# Patient Record
Sex: MALE | Employment: OTHER | URBAN - METROPOLITAN AREA
[De-identification: names, ages, dates, MRNs, and addresses within clinical notes are randomized per-mention and may not be internally consistent; named-entity substitution may affect disease eponyms.]

---

## 2019-03-16 ENCOUNTER — HOSPITAL ENCOUNTER (EMERGENCY)
Facility: CLINIC | Age: 29
Discharge: HOME | End: 2019-03-16
Admitting: PHYSICIAN ASSISTANT

## 2019-03-16 VITALS
SYSTOLIC BLOOD PRESSURE: 155 MMHG | DIASTOLIC BLOOD PRESSURE: 77 MMHG | OXYGEN SATURATION: 100 % | TEMPERATURE: 98.2 F | HEIGHT: 70 IN | RESPIRATION RATE: 20 BRPM | WEIGHT: 174.16 LBS | BODY MASS INDEX: 24.93 KG/M2 | HEART RATE: 84 BPM

## 2019-03-16 DIAGNOSIS — R00.2 PALPITATIONS: ICD-10-CM

## 2019-03-16 DIAGNOSIS — T50.905A ADVERSE EFFECT OF DRUG, INITIAL ENCOUNTER: ICD-10-CM

## 2019-03-16 PROCEDURE — 99282 EMERGENCY DEPT VISIT SF MDM: CPT

## 2019-03-16 SDOH — HEALTH STABILITY: MENTAL HEALTH: HOW OFTEN DO YOU HAVE A DRINK CONTAINING ALCOHOL?: NEVER

## 2019-03-16 ASSESSMENT — ENCOUNTER SYMPTOMS
PALPITATIONS: 1
SHORTNESS OF BREATH: 0
TREMORS: 1

## 2019-03-16 ASSESSMENT — MIFFLIN-ST. JEOR: SCORE: 1757.5

## 2019-03-16 NOTE — ED AVS SNAPSHOT
Emergency Department  [address] [facility] [address]  Phone:  [phone]  Fax:  [phone]                                    Tristen Hernandez   MRN: [id number]    Department:   Emergency Department   Date of Visit:  3/16/2019           After Visit Summary Signature Page    I have received my discharge instructions, and my questions have been answered. I have discussed any challenges I see with this plan with the nurse or doctor.    ..........................................................................................................................................  Patient/Patient Representative Signature      ..........................................................................................................................................  Patient Representative Print Name and Relationship to Patient    ..................................................               ................................................  Date                                   Time    ..........................................................................................................................................  Reviewed by Signature/Title    ...................................................              ..............................................  Date                                               Time          22EPIC Rev 08/18

## 2019-03-16 NOTE — ED PROVIDER NOTES
"  History     Chief Complaint:  Palpitations      HPI   The patient's friend was used to translate to Macedonian and obtain the below history, as well as to explain diagnosis, plan of treatment, reasons to return to the emergency department and appropriate follow up.     Tristen Hernandez is an otherwise healthy 28 year old male with a history of tobacco smoking who presents to the ED for evaluation of palpitations. The patient reports that he has been taking a caffeine-based weight loss supplement, \"Lipo 6 Black,\" for the past three days. His friend states that he took one of these pills today and was warming up to play soccer one hour ago, when he began to experience the onset of palpitations and tremors. The patient reportedly felt as though his \"heart was beating fast\" and had concerns for a possible medication reaction, prompting him to visit the ED. Here in the ED, the patient endorses persistent palpitations but denies any chest pain or shortness of breath. Of note, the patient denies any history of cardiac problems or any other medical problems.    Allergies:  No known drug allergies.     Medications:    The patient is currently on no regular medications.     Past Medical History:    The patient denies any significant past medical history including cardiac history.    Past Surgical History:    The patient does not have any pertinent past surgical history.     Family History:    History reviewed. No pertinent family history.     Social History:  Marital Status:  Single   Smoking Status: Current every day smoker   Alcohol Use: No    Negative for drug use.      Review of Systems   Respiratory: Negative for shortness of breath.    Cardiovascular: Positive for palpitations. Negative for chest pain.   Neurological: Positive for tremors.   All other systems reviewed and are negative.    Physical Exam     Patient Vitals for the past 24 hrs:   BP Temp Temp src Pulse Resp SpO2 Height Weight   03/16/19 1801 155/77 " "98.2  F (36.8  C) Temporal 84 20 100 % 1.78 m (5' 10.08\") 79 kg (174 lb 2.6 oz)        Physical Exam  General: Alert and cooperative with exam. Mildly anxious appearing on gurney.  Head:  Scalp is NC/AT  Eyes:  No scleral icterus, PERRL  ENT:  The external nose and ears are normal.   Neck:  Normal range of motion without rigidity.  CV:  Regular rate and rhythm    No pathologic murmur, rubs, or gallops.  Resp:  Breath sounds are clear bilaterally.  No crackles, wheezes, rhonchi.    Non-labored, no retractions or accessory muscle use  GI:  Abdomen is soft, no distension, no tenderness, no masses. No peritoneal signs.  Bowel sounds present in all quadrants  :  No suprapubic or flank tenderness  MS:  No lower extremity edema or asymmetric calf swelling.  Skin:  Warm and dry, No rash or lesions noted.  Neuro: Oriented. No gross motor deficits.  Psych: Awake. Alert. Normal affect. Appropriate interactions.     Emergency Department Course   ECG:  Indication: Palpitations  Time: 1757  Vent. Rate 85 bpm. ND interval 152. QRS duration 92. QT/QTc 378/449. P-R-T axis 61 75 33.  Normal sinus rhythm. Minimal voltage criteria for LVH, may be normal variant. Borderline ECG. Read and signed by Dr. Santos at 1800.    Emergency Department Course:  EKG obtained in the ED, see results above.     Nursing notes and vitals reviewed. (1837) I performed an exam of the patient as documented above.     (184) I rechecked the patient and discussed the results of his workup thus far.     Findings and plan explained to the Patient. Patient discharged home with instructions regarding supportive care, medications, and reasons to return. The importance of close follow-up was reviewed.     I personally reviewed all results with the Patient and answered all related questions prior to discharge.        Impression & Plan      Medical Decision Makin-year-old male who presents to the emergency department with anxiety and palpitations following " ingestion of a over-the-counter weight loss supplement.  Patient history and records reviewed.  On examination, the patient is mildly hypertensive but is not tachycardic and is otherwise vitally stable.  EKG was obtained which shows no evidence of arrhythmia or ischemia.  His symptoms are consistent with ingestion of stimulant supplement which from review of ingredients contains caffeine and several other herbal formulations.  He has no chest pain or shortness of breath at this time.  I advised the patient not to take any more of the supplement and to return to the emergency department if any new or worsening symptoms develop.  The patient was discharged home with instruction to follow-up with primary care provider as needed.    Diagnosis:    ICD-10-CM    1. Adverse effect of drug, initial encounter T50.905A    2. Palpitations R00.2        Disposition:  discharged to home    Scribe Disclosure:  I, Terrie Kay, am serving as a scribe on 3/16/2019 at 6:37 PM to personally document services performed by VÍCTOR Mariano found based on my observations and the provider's statements to me.      Terrie Kay  3/16/2019    EMERGENCY DEPARTMENT       Kristopher Campbell PA-C  03/16/19 5010

## 2021-03-28 ENCOUNTER — APPOINTMENT (OUTPATIENT)
Dept: GENERAL RADIOLOGY | Facility: CLINIC | Age: 31
DRG: 494 | End: 2021-03-28
Attending: PHYSICIAN ASSISTANT

## 2021-03-28 ENCOUNTER — HOSPITAL ENCOUNTER (INPATIENT)
Facility: CLINIC | Age: 31
LOS: 2 days | Discharge: HOME | DRG: 494 | End: 2021-03-30
Attending: PHYSICIAN ASSISTANT | Admitting: INTERNAL MEDICINE

## 2021-03-28 DIAGNOSIS — T14.8XXA SKIN ABRASION: ICD-10-CM

## 2021-03-28 DIAGNOSIS — S82.201A TIBIA/FIBULA FRACTURE, RIGHT, CLOSED, INITIAL ENCOUNTER: ICD-10-CM

## 2021-03-28 DIAGNOSIS — S82.401A TIBIA/FIBULA FRACTURE, RIGHT, CLOSED, INITIAL ENCOUNTER: ICD-10-CM

## 2021-03-28 LAB
LABORATORY COMMENT REPORT: NORMAL
SARS-COV-2 RNA RESP QL NAA+PROBE: NEGATIVE
SPECIMEN SOURCE: NORMAL

## 2021-03-28 PROCEDURE — 250N000011 HC RX IP 250 OP 636: Performed by: INTERNAL MEDICINE

## 2021-03-28 PROCEDURE — 96365 THER/PROPH/DIAG IV INF INIT: CPT

## 2021-03-28 PROCEDURE — 250N000013 HC RX MED GY IP 250 OP 250 PS 637: Performed by: PHYSICIAN ASSISTANT

## 2021-03-28 PROCEDURE — 90715 TDAP VACCINE 7 YRS/> IM: CPT | Performed by: PHYSICIAN ASSISTANT

## 2021-03-28 PROCEDURE — 99285 EMERGENCY DEPT VISIT HI MDM: CPT | Mod: 25

## 2021-03-28 PROCEDURE — C9803 HOPD COVID-19 SPEC COLLECT: HCPCS

## 2021-03-28 PROCEDURE — 96375 TX/PRO/DX INJ NEW DRUG ADDON: CPT

## 2021-03-28 PROCEDURE — 87635 SARS-COV-2 COVID-19 AMP PRB: CPT | Performed by: PHYSICIAN ASSISTANT

## 2021-03-28 PROCEDURE — 120N000001 HC R&B MED SURG/OB

## 2021-03-28 PROCEDURE — 250N000013 HC RX MED GY IP 250 OP 250 PS 637: Performed by: INTERNAL MEDICINE

## 2021-03-28 PROCEDURE — 250N000011 HC RX IP 250 OP 636: Performed by: PHYSICIAN ASSISTANT

## 2021-03-28 PROCEDURE — 29505 APPLICATION LONG LEG SPLINT: CPT | Mod: RT

## 2021-03-28 PROCEDURE — 250N000011 HC RX IP 250 OP 636: Performed by: EMERGENCY MEDICINE

## 2021-03-28 PROCEDURE — 90471 IMMUNIZATION ADMIN: CPT | Performed by: PHYSICIAN ASSISTANT

## 2021-03-28 PROCEDURE — 96376 TX/PRO/DX INJ SAME DRUG ADON: CPT

## 2021-03-28 PROCEDURE — 99223 1ST HOSP IP/OBS HIGH 75: CPT | Mod: AI | Performed by: INTERNAL MEDICINE

## 2021-03-28 PROCEDURE — 73590 X-RAY EXAM OF LOWER LEG: CPT | Mod: RT

## 2021-03-28 PROCEDURE — 73610 X-RAY EXAM OF ANKLE: CPT | Mod: RT

## 2021-03-28 RX ORDER — AMOXICILLIN 250 MG
2 CAPSULE ORAL 2 TIMES DAILY PRN
Status: DISCONTINUED | OUTPATIENT
Start: 2021-03-28 | End: 2021-03-30 | Stop reason: HOSPADM

## 2021-03-28 RX ORDER — PROCHLORPERAZINE MALEATE 5 MG
10 TABLET ORAL EVERY 6 HOURS PRN
Status: DISCONTINUED | OUTPATIENT
Start: 2021-03-28 | End: 2021-03-29

## 2021-03-28 RX ORDER — NALOXONE HYDROCHLORIDE 0.4 MG/ML
0.4 INJECTION, SOLUTION INTRAMUSCULAR; INTRAVENOUS; SUBCUTANEOUS
Status: DISCONTINUED | OUTPATIENT
Start: 2021-03-28 | End: 2021-03-30 | Stop reason: HOSPADM

## 2021-03-28 RX ORDER — HYDROMORPHONE HYDROCHLORIDE 1 MG/ML
.3-.5 INJECTION, SOLUTION INTRAMUSCULAR; INTRAVENOUS; SUBCUTANEOUS
Status: DISCONTINUED | OUTPATIENT
Start: 2021-03-28 | End: 2021-03-29

## 2021-03-28 RX ORDER — PROCHLORPERAZINE 25 MG
25 SUPPOSITORY, RECTAL RECTAL EVERY 12 HOURS PRN
Status: DISCONTINUED | OUTPATIENT
Start: 2021-03-28 | End: 2021-03-29

## 2021-03-28 RX ORDER — ACETAMINOPHEN 325 MG/1
650 TABLET ORAL EVERY 4 HOURS PRN
Status: DISCONTINUED | OUTPATIENT
Start: 2021-03-28 | End: 2021-03-29

## 2021-03-28 RX ORDER — ONDANSETRON 2 MG/ML
4 INJECTION INTRAMUSCULAR; INTRAVENOUS ONCE
Status: COMPLETED | OUTPATIENT
Start: 2021-03-28 | End: 2021-03-28

## 2021-03-28 RX ORDER — NALOXONE HYDROCHLORIDE 0.4 MG/ML
0.2 INJECTION, SOLUTION INTRAMUSCULAR; INTRAVENOUS; SUBCUTANEOUS
Status: DISCONTINUED | OUTPATIENT
Start: 2021-03-28 | End: 2021-03-30 | Stop reason: HOSPADM

## 2021-03-28 RX ORDER — MORPHINE SULFATE 4 MG/ML
4 INJECTION, SOLUTION INTRAMUSCULAR; INTRAVENOUS ONCE
Status: COMPLETED | OUTPATIENT
Start: 2021-03-28 | End: 2021-03-28

## 2021-03-28 RX ORDER — IBUPROFEN 600 MG/1
600 TABLET, FILM COATED ORAL EVERY 6 HOURS PRN
Status: DISCONTINUED | OUTPATIENT
Start: 2021-03-28 | End: 2021-03-30 | Stop reason: HOSPADM

## 2021-03-28 RX ORDER — CEFAZOLIN SODIUM 2 G/100ML
2 INJECTION, SOLUTION INTRAVENOUS ONCE
Status: COMPLETED | OUTPATIENT
Start: 2021-03-28 | End: 2021-03-28

## 2021-03-28 RX ORDER — HYDROMORPHONE HYDROCHLORIDE 1 MG/ML
0.5 INJECTION, SOLUTION INTRAMUSCULAR; INTRAVENOUS; SUBCUTANEOUS ONCE
Status: COMPLETED | OUTPATIENT
Start: 2021-03-28 | End: 2021-03-28

## 2021-03-28 RX ORDER — POLYETHYLENE GLYCOL 3350 17 G/17G
17 POWDER, FOR SOLUTION ORAL DAILY PRN
Status: DISCONTINUED | OUTPATIENT
Start: 2021-03-28 | End: 2021-03-30 | Stop reason: HOSPADM

## 2021-03-28 RX ORDER — ONDANSETRON 2 MG/ML
4 INJECTION INTRAMUSCULAR; INTRAVENOUS EVERY 6 HOURS PRN
Status: DISCONTINUED | OUTPATIENT
Start: 2021-03-28 | End: 2021-03-30 | Stop reason: HOSPADM

## 2021-03-28 RX ORDER — AMOXICILLIN 250 MG
1 CAPSULE ORAL 2 TIMES DAILY PRN
Status: DISCONTINUED | OUTPATIENT
Start: 2021-03-28 | End: 2021-03-30 | Stop reason: HOSPADM

## 2021-03-28 RX ORDER — OXYCODONE HYDROCHLORIDE 5 MG/1
5-10 TABLET ORAL
Status: DISCONTINUED | OUTPATIENT
Start: 2021-03-28 | End: 2021-03-29

## 2021-03-28 RX ORDER — ONDANSETRON 4 MG/1
4 TABLET, ORALLY DISINTEGRATING ORAL EVERY 6 HOURS PRN
Status: DISCONTINUED | OUTPATIENT
Start: 2021-03-28 | End: 2021-03-30 | Stop reason: HOSPADM

## 2021-03-28 RX ORDER — CEFAZOLIN SODIUM 2 G/100ML
2 INJECTION, SOLUTION INTRAVENOUS EVERY 8 HOURS
Status: DISCONTINUED | OUTPATIENT
Start: 2021-03-28 | End: 2021-03-29

## 2021-03-28 RX ORDER — CYCLOBENZAPRINE HCL 10 MG
10 TABLET ORAL EVERY 8 HOURS PRN
Status: DISCONTINUED | OUTPATIENT
Start: 2021-03-28 | End: 2021-03-30 | Stop reason: HOSPADM

## 2021-03-28 RX ORDER — LIDOCAINE 40 MG/G
CREAM TOPICAL
Status: DISCONTINUED | OUTPATIENT
Start: 2021-03-28 | End: 2021-03-30 | Stop reason: HOSPADM

## 2021-03-28 RX ORDER — LORAZEPAM 2 MG/ML
0.5 INJECTION INTRAMUSCULAR ONCE
Status: COMPLETED | OUTPATIENT
Start: 2021-03-28 | End: 2021-03-28

## 2021-03-28 RX ORDER — OXYCODONE AND ACETAMINOPHEN 5; 325 MG/1; MG/1
1 TABLET ORAL ONCE
Status: COMPLETED | OUTPATIENT
Start: 2021-03-28 | End: 2021-03-28

## 2021-03-28 RX ADMIN — CLOSTRIDIUM TETANI TOXOID ANTIGEN (FORMALDEHYDE INACTIVATED), CORYNEBACTERIUM DIPHTHERIAE TOXOID ANTIGEN (FORMALDEHYDE INACTIVATED), BORDETELLA PERTUSSIS TOXOID ANTIGEN (GLUTARALDEHYDE INACTIVATED), BORDETELLA PERTUSSIS FILAMENTOUS HEMAGGLUTININ ANTIGEN (FORMALDEHYDE INACTIVATED), BORDETELLA PERTUSSIS PERTACTIN ANTIGEN, AND BORDETELLA PERTUSSIS FIMBRIAE 2/3 ANTIGEN 0.5 ML: 5; 2; 2.5; 5; 3; 5 INJECTION, SUSPENSION INTRAMUSCULAR at 12:46

## 2021-03-28 RX ADMIN — HYDROMORPHONE HYDROCHLORIDE 0.5 MG: 1 INJECTION, SOLUTION INTRAMUSCULAR; INTRAVENOUS; SUBCUTANEOUS at 14:05

## 2021-03-28 RX ADMIN — CEFAZOLIN SODIUM 2 G: 2 INJECTION, SOLUTION INTRAVENOUS at 21:33

## 2021-03-28 RX ADMIN — ACETAMINOPHEN 650 MG: 325 TABLET, FILM COATED ORAL at 21:33

## 2021-03-28 RX ADMIN — HYDROMORPHONE HYDROCHLORIDE 1 MG: 1 INJECTION, SOLUTION INTRAMUSCULAR; INTRAVENOUS; SUBCUTANEOUS at 11:52

## 2021-03-28 RX ADMIN — LORAZEPAM 0.5 MG: 2 INJECTION INTRAMUSCULAR; INTRAVENOUS at 14:16

## 2021-03-28 RX ADMIN — HYDROMORPHONE HYDROCHLORIDE 1 MG: 1 INJECTION, SOLUTION INTRAMUSCULAR; INTRAVENOUS; SUBCUTANEOUS at 12:43

## 2021-03-28 RX ADMIN — MORPHINE SULFATE 4 MG: 4 INJECTION INTRAVENOUS at 14:32

## 2021-03-28 RX ADMIN — CYCLOBENZAPRINE HYDROCHLORIDE 10 MG: 10 TABLET, FILM COATED ORAL at 17:03

## 2021-03-28 RX ADMIN — ONDANSETRON 4 MG: 2 INJECTION INTRAMUSCULAR; INTRAVENOUS at 12:41

## 2021-03-28 RX ADMIN — OXYCODONE HYDROCHLORIDE AND ACETAMINOPHEN 1 TABLET: 5; 325 TABLET ORAL at 14:32

## 2021-03-28 RX ADMIN — IBUPROFEN 600 MG: 600 TABLET ORAL at 17:03

## 2021-03-28 RX ADMIN — CEFAZOLIN SODIUM 2 G: 2 INJECTION, SOLUTION INTRAVENOUS at 13:52

## 2021-03-28 ASSESSMENT — ENCOUNTER SYMPTOMS
NUMBNESS: 0
HEADACHES: 0
ARTHRALGIAS: 0
JOINT SWELLING: 0
WEAKNESS: 0
WOUND: 1

## 2021-03-28 ASSESSMENT — ACTIVITIES OF DAILY LIVING (ADL)
ADLS_ACUITY_SCORE: 14
ADLS_ACUITY_SCORE: 12

## 2021-03-28 NOTE — PROGRESS NOTES
Ortho Brief - full consult to follow    29 yo M with Right tib/fib fx, ?Type 1 open, while playing soccer    Rec  Elevate RLE foam ramp  NPO p MN  Covid test  Plan IMN tibia tomorrow  Ancef q8h until surgery    Luis Lynn MD

## 2021-03-28 NOTE — PHARMACY-ADMISSION MEDICATION HISTORY
Admission medication history interview status for this patient is complete. See [facility] admission navigator for allergy information, prior to admission medications and immunization status.     Medication history interview done, indicate source(s): Patient  Medication history resources (including written lists, pill bottles, clinic record):None  Pharmacy: -    Changes made to PTA medication list:  Added: -  Deleted: -  Changed: -    Actions taken by pharmacist (provider contacted, etc):None     Additional medication history information:None    Medication reconciliation/reorder completed by provider prior to medication history?  no   (Y/N)     For patients on insulin therapy:   Do you use sliding scale insulin based on blood sugars?   What is your pre-meal insulin coverage?    Do you typically eat three meals a day?   How many times do you check your blood glucose per day?   How many episodes of hypoglycemia do you typically have per month?   Do you have a Continuous Glucose Monitor (CGM)?      Prior to Admission medications    Not on File

## 2021-03-28 NOTE — H&P (VIEW-ONLY)
History     Chief Complaint:  Leg Pain    HPI  Tristen Hernandez is a 30 year old male who presents for evaluation of right leg pain. The patient was playing soccer this morning when he kicked out with his right leg and kicked the knee of another player. Per his friend who was also playing, there was a loud crack and the patient's leg is now swollen and appears deformed. There is an abrasion to the right shin over the deformity. He has full sensation and range of motion in his right foot and denies any pain in his right knee or hip. All other extremities are atraumatic ad he denies any head trauma with this incident.         Review of Systems   Musculoskeletal: Negative for arthralgias and joint swelling.   Skin: Positive for wound.   Neurological: Negative for weakness, numbness and headaches.   All other systems reviewed and are negative.    Allergies:  The patient has no known allergies.     Medications:    The patient is not currently taking any prescribed medications.    Past Medical History:    No previous injuries to this leg    Social History:  The patient arrives with a friend  Plays soccer in [employer]      Physical Exam     Patient Vitals for the past 24 hrs:   BP Temp Pulse Resp SpO2   03/28/21 1415 -- -- -- -- 100 %   03/28/21 1400 111/72 -- 64 -- 99 %   03/28/21 1345 108/64 -- 73 -- 100 %   03/28/21 1330 113/63 -- 72 -- 98 %   03/28/21 1315 110/62 -- 80 -- 98 %   03/28/21 1300 111/70 -- 66 -- 99 %   03/28/21 1250 106/60 -- -- -- 99 %   03/28/21 1245 106/60 -- 72 -- --   03/28/21 1210 -- -- -- -- 100 %   03/28/21 1200 -- -- -- -- 100 %   03/28/21 1155 -- -- -- -- 100 %   03/28/21 1150 -- -- -- -- 100 %   03/28/21 1146 -- 97.4  F (36.3  C) -- -- --   03/28/21 1145 122/72 -- 74 20 100 %     Physical Exam  General: Alert and interactive. Appears well.   Head: Atraumatic, without obvious lesion, abrasion, hematoma.   Eyes: The pupils are equal and round. No scleral icterus.   ENT: No obvious  abnormalities to the ears or nose. Mucous membranes moist.   Neck:Trachea is in the midline. No obvious swelling to the neck. Full range of motion.   CV: Regular rate. Extremities well perfused. Capillary refill brisk in toes. DP pulses normal.   Resp: Non-labored, no retractions or accessory muscle use.     GI: Abdomen is not distended.   MS: Moving all extremities well.   Bruising and swelling to the right mid-shin with an abrasion, as pictured below. There is diffuse tenderness to this reason. Compartments are soft.   Patient able to wiggle toes, dorsiflex and plantar flex against resistance. No tenderness to medial or lateral malleolus. No tenderness to the metatarsals nor phalanges. Knee is non-tender but ROM deferred due to obvious pain mid-shin.           Skin: Abrasion to the shin. No obvious bone involvement within wound.   Neuro: Alert and oriented x 3. Non-focal examination.    Psych: Awake. Alert.  Normal affect. Appropriate interactions.    Emergency Department Course     Imaging:  XR Ankle, 3 views right  IMPRESSION: There are known diaphyseal fractures in the tibia and   fibula. Ankle radiographs show no other abnormalities  Reading per radiology    XR Tibia & Fibula, 2 views right  IMPRESSION:   1. Comminuted fracture involving the mid shaft of the tibia with 1 cm   of displacement and moderate apex medial and mild apex anterior   angulation.   2. Nondisplaced oblique fracture in the proximal shaft of the tibia.   3. Comminuted fracture at the junction of the proximal and middle   thirds of the shaft of the fibula with 29 mm of displacement and mild   apex medial and anterior angulation. There is also up to 1 cm of   bayonetting  Reading per radiology    Laboratory:  Asymptomatic COVID-19 PCR: negaitve      Procedures    Left Leg Splint Placement     PLACEMENT: Posterior long leg and stirrup splint was applied to the right lower extremity and after placement I checked and adjusted the fit to ensure  proper positioning. The patient was more comfortable with the splint in place. Sensation and circulation are intact after splint placement.     Emergency Department Course:    Reviewed:  I reviewed nursing notes, vitals and past medical history    Assessments:  1155 I obtained history and examined the patient as noted above.   1401 I rechecked the patient and explained findings. Splint placed as above.     Consults:   1320: I spoke with Dr. Lynn of orthopedic surgery regarding this patient.    1340: I spoke with Eri Soto of hospitalist service regarding this patient.    1350: I spoke with Dr. Faust of hospitalist service regarding this patient.    Interventions:  1152 Dilaudid 1 mg IV injection  1241 Zofran 4mg IV injection   1243 Dilaudid 1 mg IV injection  1246 Tdap 0.5 mL IM  1352 Ancef 2 g IV  1405 Dilaudid 0.5mg IV injection  1416 Lorazepam, 1 mg, IV injection  1432 Percocet 1 tab  1432 Morphine 4 mg IV    Disposition:  The patient was admitted to the hospital under the care of Dr. Faust.     Impression & Plan      Medical Decision Making:  Tristen Hernandez is a 30 year old male who presents to the emergency department today for evaluation of leg pain after an accident while playing soccer today.  X-rays reveal a comminuted fractures of the midshaft of both the tibia and the fibula that will need surgical repair. Discussed with on-call orthopedic trauma surgery who suggested admission to the hospital service for orthopedic management (tomorrow). He has a small abrasion without any bone protruding through the wound. However, orthopedics would like him to be started on empiric antibiotics for possible open fracture which qualifies him for inpatient admission. Compartments soft. Discussed with Dr. Faust, who will admit to inpatient medical service for further evaluation and treatment.     Covid-19  Tristen Hernandez was evaluated during a global COVID-19 pandemic, which necessitated  consideration that the patient might be at risk for infection with the SARS-CoV-2 virus that causes COVID-19.   Applicable protocols for evaluation were followed during the patient's care.   COVID-19 was considered as part of the patient's evaluation. The plan for testing is:  a test was obtained during this visit.      Diagnosis:    ICD-10-CM    1. Tibia/fibula fracture, right, closed, initial encounter  S82.201A Asymptomatic SARS-CoV-2 COVID-19 Virus (Coronavirus) by PCR    S82.401A    2. Skin abrasion  T14.8XXA        Scribe Disclosure:  Christin SHOEMAKER, am serving as a scribe at 11:55 AM on 3/28/2021 to document services personally performed by Dana Banda APC based on my observations and the provider's statements to me.    [facility] EMERGENCY DEPT     Dana Banda PA-C  03/28/21 8656

## 2021-03-28 NOTE — ED PROVIDER NOTES
Emergency Department Attending Supervision Note  3/28/2021  2:08 PM      I evaluated this patient in conjunction with Dana Banda PA-C      Briefly, the patient presented with right leg pain. The patient was playing soccer and he kicked out with his right leg and hit another player.      On my exam,     Physical Exam  CTAB, RRR  HEENT WNL  RLE - obvious swelling and deformity mid tibia, 2+ pulses with normal sensation    Results: See APC note    ED course:  XR, IV, pain meds, splint placed with assistance of APC    My impression is RLE tib/fib fracture  1.Splint placed  2.Ortho consult  3.admit for pain control        Diagnosis    ICD-10-CM    1. Tibia/fibula fracture, right, closed, initial encounter  S82.201A Asymptomatic SARS-CoV-2 COVID-19 Virus (Coronavirus) by PCR    S82.401A    2. Skin abrasion  T14.8XXA          Jennifer Cuello MD    I, [facility], am serving as a scribe at 2:08 PM on 3/28/2021 to document services personally performed by Jennifer Cuello MD based on my observations and the provider's statements to me.          Jennifer Cuello MD  04/13/21 4814

## 2021-03-28 NOTE — ED NOTES
Bed: ED24  Expected date: 3/28/21  Expected time: 11:37 AM  Means of arrival: Ambulance  Comments:  BV 2 30 M

## 2021-03-28 NOTE — H&P
Admitted:     03/28/2021      CHIEF COMPLAINT:  Right leg pain and deformity.      HISTORY OF PRESENT ILLNESS:  This is a 30-year-old Peruvian speaking male without any past medical history and not on any medication, who presented to the emergency room today with severe right leg pain and deformity.  The patient was playing soccer this morning when he kicked the ball, and he happened kick the knee of another player and felt pain.  Also, the other players and heard a loud crack, and the patient's leg was swollen and deformed.  The patient was brought to the emergency room.  There was area of abrasion to the right shin over the deformity with some opening and oozing blood.  He has full sensation and range of motion at the knee, as well as at the ankle joint.  He stated his pain is controlled at rest.  The patient denied any other symptoms.  In the emergency room, he was evaluated.  X-ray of the right tibia and fibula showed a comminuted fracture involving the midshaft of the tibia with displacement.  Also, a nondisplaced oblique fracture of the proximal shaft of the tibia and proximal medial third of the left shaft of the fibula is also fractured and displaced.  Orthopedic surgeon was contacted in the emergency room.  The patient was given a dose of IV antibiotic and is being admitted to the hospital.      PAST MEDICAL HISTORY:  None.     PAST SURGICAL HISTORY:  None.      ALLERGIES:  NO KNOWN DRUG ALLERGIES.      REVIEW OF SYSTEMS:  Ten points reviewed; all are negative except those mentioned in history of present illness.      HOME MEDICATIONS:  None.      PHYSICAL EXAMINATION:   GENERAL:  The patient is awake, alert, pleasant, and not in distress at rest.   VITAL SIGNS:  Blood pressure 110/62, pulse rate 80, temperature 97.4, oxygen saturation 98%.   HEENT:  Pink, nonicteric.  Extraocular muscle movement intact.   NECK:  Supple, no JVD, no thyromegaly, no lymphadenopathy.   CHEST:  Good air entry bilaterally.    CARDIOVASCULAR:  S1 and S2 regular, no gallop or murmur.   ABDOMEN:  Soft, nondistended, nontender.   EXTREMITIES:  Significant finding on the right lower extremity.  There is bruising and swelling to the right mid shin with areas of abrasion and oozing blood.  There is diffuse tenderness to the area, but the compartments are soft.  Capillary refill in his toes are normal.  There is no tingling sensation.  No neurovascular compromise.   NEUROLOGIC:  Alert and oriented x 3.  No focal neurologic deficits.   PSYCHIATRIC:  Normal mood and affect.  Keeps eye contact.  Responds to question appropriately.      DIAGNOSTIC TESTS OF INTEREST:  X-ray of the tibia and fibula showed comminuted fracture involving the midshaft of the tibia with 1 cm of displacement and moderate upper extremity and mid-apex anterior angulation, nondisplaced oblique fracture of the proximal shaft of the tibia.  Comminuted fracture of the proximal mid-third of the shaft of the fibula was also noted.  Labs were not done.  X-ray of the ankle joint showed no abnormalities.        ASSESSMENT AND PLAN:  This is a 30-year-old gentleman who presented to the emergency room following a trauma to his right leg while playing soccer.  He kicked out with his right leg and kicked the knee another player.  There was a large crack, and he was found to have a fracture of the right lower extremity.  He is being admitted to the hospital.   1.  Right mid-tibia and fibular fracture.   2.  Right shin ulcerated wound.      PLAN:  The patient is being admitted as an inpatient for pain control.  Orthopedic Surgery was consulted and recommended IV antibiotic and for the patient to have surgery tomorrow.  There is an area of ulcerated wound.  It is not clear if it is communicating or directly in contact with the bone.  It seems to be superficial, site of impact but doubt if it is communicating with the area of the fracture.  There is no neurovascular compromise at this point  that needs to be monitored.  Orthopedic Surgery is involved.  He will be on Ancef 2 grams IV every 8 hours.  The patient will be n.p.o. after midnight.  Ortho stated to the ED provider the surgery will be done tomorrow.  I discussed with the ED provider, Dana Banda PA-C, and I also discussed with the patient.  The patient understands English.  He speaks Romanian.  His friend is at bedside who helped with interpretation per the patient's request.         DARNELL JOHNS MD             D: 2021   T: 2021   MT:       Name:     ANKUR PERRY   MRN:      [id number]        Account:      [id number]   :      1990        Admitted:     2021                   Document: [id number]

## 2021-03-28 NOTE — ED PROVIDER NOTES
History     Chief Complaint:  Leg Pain    HPI  Tristen Hernandez is a 30 year old male who presents for evaluation of right leg pain. The patient was playing soccer this morning when he kicked out with his right leg and kicked the knee of another player. Per his friend who was also playing, there was a loud crack and the patient's leg is now swollen and appears deformed. There is an abrasion to the right shin over the deformity. He has full sensation and range of motion in his right foot and denies any pain in his right knee or hip. All other extremities are atraumatic ad he denies any head trauma with this incident.         Review of Systems   Musculoskeletal: Negative for arthralgias and joint swelling.   Skin: Positive for wound.   Neurological: Negative for weakness, numbness and headaches.   All other systems reviewed and are negative.    Allergies:  The patient has no known allergies.     Medications:    The patient is not currently taking any prescribed medications.    Past Medical History:    No previous injuries to this leg    Social History:  The patient arrives with a friend  Plays soccer in [employer]      Physical Exam     Patient Vitals for the past 24 hrs:   BP Temp Pulse Resp SpO2   03/28/21 1415 -- -- -- -- 100 %   03/28/21 1400 111/72 -- 64 -- 99 %   03/28/21 1345 108/64 -- 73 -- 100 %   03/28/21 1330 113/63 -- 72 -- 98 %   03/28/21 1315 110/62 -- 80 -- 98 %   03/28/21 1300 111/70 -- 66 -- 99 %   03/28/21 1250 106/60 -- -- -- 99 %   03/28/21 1245 106/60 -- 72 -- --   03/28/21 1210 -- -- -- -- 100 %   03/28/21 1200 -- -- -- -- 100 %   03/28/21 1155 -- -- -- -- 100 %   03/28/21 1150 -- -- -- -- 100 %   03/28/21 1146 -- 97.4  F (36.3  C) -- -- --   03/28/21 1145 122/72 -- 74 20 100 %     Physical Exam  General: Alert and interactive. Appears well.   Head: Atraumatic, without obvious lesion, abrasion, hematoma.   Eyes: The pupils are equal and round. No scleral icterus.   ENT: No obvious  abnormalities to the ears or nose. Mucous membranes moist.   Neck:Trachea is in the midline. No obvious swelling to the neck. Full range of motion.   CV: Regular rate. Extremities well perfused. Capillary refill brisk in toes. DP pulses normal.   Resp: Non-labored, no retractions or accessory muscle use.     GI: Abdomen is not distended.   MS: Moving all extremities well.   Bruising and swelling to the right mid-shin with an abrasion, as pictured below. There is diffuse tenderness to this reason. Compartments are soft.   Patient able to wiggle toes, dorsiflex and plantar flex against resistance. No tenderness to medial or lateral malleolus. No tenderness to the metatarsals nor phalanges. Knee is non-tender but ROM deferred due to obvious pain mid-shin.           Skin: Abrasion to the shin. No obvious bone involvement within wound.   Neuro: Alert and oriented x 3. Non-focal examination.    Psych: Awake. Alert.  Normal affect. Appropriate interactions.    Emergency Department Course     Imaging:  XR Ankle, 3 views right  IMPRESSION: There are known diaphyseal fractures in the tibia and   fibula. Ankle radiographs show no other abnormalities  Reading per radiology    XR Tibia & Fibula, 2 views right  IMPRESSION:   1. Comminuted fracture involving the mid shaft of the tibia with 1 cm   of displacement and moderate apex medial and mild apex anterior   angulation.   2. Nondisplaced oblique fracture in the proximal shaft of the tibia.   3. Comminuted fracture at the junction of the proximal and middle   thirds of the shaft of the fibula with 29 mm of displacement and mild   apex medial and anterior angulation. There is also up to 1 cm of   bayonetting  Reading per radiology    Laboratory:  Asymptomatic COVID-19 PCR: negaitve      Procedures    Left Leg Splint Placement     PLACEMENT: Posterior long leg and stirrup splint was applied to the right lower extremity and after placement I checked and adjusted the fit to ensure  proper positioning. The patient was more comfortable with the splint in place. Sensation and circulation are intact after splint placement.     Emergency Department Course:    Reviewed:  I reviewed nursing notes, vitals and past medical history    Assessments:  1155 I obtained history and examined the patient as noted above.   1401 I rechecked the patient and explained findings. Splint placed as above.     Consults:   1320: I spoke with Dr. Lynn of orthopedic surgery regarding this patient.    1340: I spoke with Eri Soto of hospitalist service regarding this patient.    1350: I spoke with Dr. Faust of hospitalist service regarding this patient.    Interventions:  1152 Dilaudid 1 mg IV injection  1241 Zofran 4mg IV injection   1243 Dilaudid 1 mg IV injection  1246 Tdap 0.5 mL IM  1352 Ancef 2 g IV  1405 Dilaudid 0.5mg IV injection  1416 Lorazepam, 1 mg, IV injection  1432 Percocet 1 tab  1432 Morphine 4 mg IV    Disposition:  The patient was admitted to the hospital under the care of Dr. Faust.     Impression & Plan      Medical Decision Making:  Tristen Hernandez is a 30 year old male who presents to the emergency department today for evaluation of leg pain after an accident while playing soccer today.  X-rays reveal a comminuted fractures of the midshaft of both the tibia and the fibula that will need surgical repair. Discussed with on-call orthopedic trauma surgery who suggested admission to the hospital service for orthopedic management (tomorrow). He has a small abrasion without any bone protruding through the wound. However, orthopedics would like him to be started on empiric antibiotics for possible open fracture which qualifies him for inpatient admission. Compartments soft. Discussed with Dr. Faust, who will admit to inpatient medical service for further evaluation and treatment.     Covid-19  Tristen Hernandez was evaluated during a global COVID-19 pandemic, which necessitated  consideration that the patient might be at risk for infection with the SARS-CoV-2 virus that causes COVID-19.   Applicable protocols for evaluation were followed during the patient's care.   COVID-19 was considered as part of the patient's evaluation. The plan for testing is:  a test was obtained during this visit.      Diagnosis:    ICD-10-CM    1. Tibia/fibula fracture, right, closed, initial encounter  S82.201A Asymptomatic SARS-CoV-2 COVID-19 Virus (Coronavirus) by PCR    S82.401A    2. Skin abrasion  T14.8XXA        Scribe Disclosure:  Christin SHOEMAKER, am serving as a scribe at 11:55 AM on 3/28/2021 to document services personally performed by Dana Banda APC based on my observations and the provider's statements to me.    [facility] EMERGENCY DEPT     Dana Banda PA-C  03/28/21 [id number]

## 2021-03-28 NOTE — ED NOTES
[facility]  ED Nurse Handoff Report    Tristen Hernandez is a 30 year old male   ED Chief complaint: Leg Pain  . ED Diagnosis:   Final diagnoses:   Tibia/fibula fracture, right, closed, initial encounter     Allergies: No Known Allergies    Code Status: Full Code  Activity level - Baseline/Home:  Independent. Activity Level - Current:   Assist X 2. Lift room needed: No. Bariatric: No   Needed: Yes   Isolation: No. Infection: Not Applicable.     Vital Signs:   Vitals:    03/28/21 1330 03/28/21 1345 03/28/21 1400 03/28/21 1415   BP: 113/63 108/64 111/72    Pulse: 72 73 64    Resp:       Temp:       SpO2: 98% 100% 99% 100%       Cardiac Rhythm:  ,      Pain level:    Patient confused: No. Patient Falls Risk: No.   Elimination Status: Has not voided yet   Patient Report - Initial Complaint: Leg pain. Focused Assessment: Tristen Hernandez is a 30 year old male who presents for evaluation of right leg pain. The patient was playing soccer this morning when he kicked out with his right leg and kicked the knee of another player. Per his friend who was also playing, there was a loud crack and the patient's leg is now swollen and appears deformed. There is an abrasion to the right shin over the deformity. He has full sensation and range of motion in his right foot and denies any pain in his right knee or hip. All other extremities are atraumatic ad he denies any head trauma with this incident.        Leg was splinted in ER  Tests Performed: Xray. Abnormal Results:   Ankle XR, G/E 3 views, right   Final Result   IMPRESSION: There are known diaphyseal fractures in the tibia and   fibula. Ankle radiographs show no other abnormalities.      DADA MIX MD      XR Tibia & Fibula Right 2 Views   Final Result   IMPRESSION:   1. Comminuted fracture involving the mid shaft of the tibia with 1 cm   of displacement and moderate apex medial and mild apex anterior   angulation.   2.  Nondisplaced oblique fracture in the proximal shaft of the tibia.   3. Comminuted fracture at the junction of the proximal and middle   thirds of the shaft of the fibula with 29 mm of displacement and mild   apex medial and anterior angulation. There is also up to 1 cm of   bayonetting.      DADA MIX MD          Treatments provided:Pain Medication, Tetanus   Family Comments:  OBS brochure/video discussed/provided to patient:  TBD  ED Medications:   Medications   ceFAZolin (ANCEF) intermittent infusion 2 g in 100 mL dextrose PRE-MIX (2 g Intravenous New Bag 3/28/21 1352)   HYDROmorphone (DILAUDID) injection 1 mg (1 mg Intravenous Given 3/28/21 1152)   Tdap (tetanus-diphtheria-acell pertussis) (ADACEL) injection 0.5 mL (0.5 mLs Intramuscular Given 3/28/21 1246)   HYDROmorphone (DILAUDID) injection 1 mg (1 mg Intravenous Given 3/28/21 1243)   ondansetron (ZOFRAN) injection 4 mg (4 mg Intravenous Given 3/28/21 1241)   HYDROmorphone (PF) (DILAUDID) injection 0.5 mg (0.5 mg Intravenous Given 3/28/21 1405)   LORazepam (ATIVAN) injection 0.5 mg (0.5 mg Intravenous Given 3/28/21 1416)     Drips infusing:  No  For the majority of the shift, the patient's behavior Green. Interventions performed were NA.    Sepsis treatment initiated: No     Patient tested for COVID 19 prior to admission:    ED Nurse Name/Phone Number: Niurka Mercedes RN,   12:57 PM  RECEIVING UNIT ED HANDOFF REVIEW    Above ED Nurse Handoff Report was reviewed: Yes  Reviewed by: Nusrat Meeks RN on March 28, 2021 at 2:37 PM

## 2021-03-28 NOTE — PROGRESS NOTES
Arrived to the floor at 1450 via cart. assisted to bed. Elevated R leg on 2 pillows, ice packs to LLE. Vitals done. Pt hungry--ordered reg diet, wife present.

## 2021-03-29 ENCOUNTER — APPOINTMENT (OUTPATIENT)
Dept: GENERAL RADIOLOGY | Facility: CLINIC | Age: 31
DRG: 494 | End: 2021-03-29
Attending: INTERNAL MEDICINE

## 2021-03-29 ENCOUNTER — ANESTHESIA EVENT (OUTPATIENT)
Dept: SURGERY | Facility: CLINIC | Age: 31
DRG: 494 | End: 2021-03-29

## 2021-03-29 ENCOUNTER — ANESTHESIA (OUTPATIENT)
Dept: SURGERY | Facility: CLINIC | Age: 31
DRG: 494 | End: 2021-03-29

## 2021-03-29 LAB
ANION GAP SERPL CALCULATED.3IONS-SCNC: 6 MMOL/L (ref 3–14)
BUN SERPL-MCNC: 22 MG/DL (ref 7–30)
CALCIUM SERPL-MCNC: 8.3 MG/DL (ref 8.5–10.1)
CHLORIDE SERPL-SCNC: 106 MMOL/L (ref 94–109)
CO2 SERPL-SCNC: 26 MMOL/L (ref 20–32)
CREAT SERPL-MCNC: 1.01 MG/DL (ref 0.66–1.25)
ERYTHROCYTE [DISTWIDTH] IN BLOOD BY AUTOMATED COUNT: 13.2 % (ref 10–15)
GFR SERPL CREATININE-BSD FRML MDRD: >90 ML/MIN/{1.73_M2}
GLUCOSE SERPL-MCNC: 92 MG/DL (ref 70–99)
HCT VFR BLD AUTO: 45.6 % (ref 40–53)
HGB BLD-MCNC: 15 G/DL (ref 13.3–17.7)
INR PPP: 1.05 (ref 0.86–1.14)
MCH RBC QN AUTO: 31.6 PG (ref 26.5–33)
MCHC RBC AUTO-ENTMCNC: 32.9 G/DL (ref 31.5–36.5)
MCV RBC AUTO: 96 FL (ref 78–100)
PLATELET # BLD AUTO: 215 10E9/L (ref 150–450)
POTASSIUM SERPL-SCNC: 4.6 MMOL/L (ref 3.4–5.3)
RBC # BLD AUTO: 4.74 10E12/L (ref 4.4–5.9)
SODIUM SERPL-SCNC: 138 MMOL/L (ref 133–144)
WBC # BLD AUTO: 9.4 10E9/L (ref 4–11)

## 2021-03-29 PROCEDURE — 250N000009 HC RX 250: Performed by: ANESTHESIOLOGY

## 2021-03-29 PROCEDURE — 258N000003 HC RX IP 258 OP 636: Performed by: ANESTHESIOLOGY

## 2021-03-29 PROCEDURE — 250N000009 HC RX 250: Performed by: ORTHOPAEDIC SURGERY

## 2021-03-29 PROCEDURE — 120N000001 HC R&B MED SURG/OB

## 2021-03-29 PROCEDURE — C1713 ANCHOR/SCREW BN/BN,TIS/BN: HCPCS | Performed by: ORTHOPAEDIC SURGERY

## 2021-03-29 PROCEDURE — 250N000013 HC RX MED GY IP 250 OP 250 PS 637: Performed by: INTERNAL MEDICINE

## 2021-03-29 PROCEDURE — 250N000011 HC RX IP 250 OP 636: Performed by: PHYSICIAN ASSISTANT

## 2021-03-29 PROCEDURE — 250N000011 HC RX IP 250 OP 636: Performed by: NURSE ANESTHETIST, CERTIFIED REGISTERED

## 2021-03-29 PROCEDURE — 360N000084 HC SURGERY LEVEL 4 W/ FLUORO, PER MIN: Performed by: ORTHOPAEDIC SURGERY

## 2021-03-29 PROCEDURE — 85027 COMPLETE CBC AUTOMATED: CPT | Performed by: INTERNAL MEDICINE

## 2021-03-29 PROCEDURE — 999N000141 HC STATISTIC PRE-PROCEDURE NURSING ASSESSMENT: Performed by: ORTHOPAEDIC SURGERY

## 2021-03-29 PROCEDURE — 85610 PROTHROMBIN TIME: CPT | Performed by: INTERNAL MEDICINE

## 2021-03-29 PROCEDURE — 258N000001 HC RX 258: Performed by: ORTHOPAEDIC SURGERY

## 2021-03-29 PROCEDURE — 999N000179 XR SURGERY CARM FLUORO LESS THAN 5 MIN W STILLS: Mod: TC

## 2021-03-29 PROCEDURE — 0QSG06Z REPOSITION RIGHT TIBIA WITH INTRAMEDULLARY INTERNAL FIXATION DEVICE, OPEN APPROACH: ICD-10-PCS | Performed by: ORTHOPAEDIC SURGERY

## 2021-03-29 PROCEDURE — 250N000011 HC RX IP 250 OP 636: Performed by: INTERNAL MEDICINE

## 2021-03-29 PROCEDURE — 99207 PR NON-BILLABLE SERV PER CHARTING: CPT | Performed by: INTERNAL MEDICINE

## 2021-03-29 PROCEDURE — 710N000009 HC RECOVERY PHASE 1, LEVEL 1, PER MIN: Performed by: ORTHOPAEDIC SURGERY

## 2021-03-29 PROCEDURE — 250N000013 HC RX MED GY IP 250 OP 250 PS 637: Performed by: PHYSICIAN ASSISTANT

## 2021-03-29 PROCEDURE — 370N000017 HC ANESTHESIA TECHNICAL FEE, PER MIN: Performed by: ORTHOPAEDIC SURGERY

## 2021-03-29 PROCEDURE — 272N000001 HC OR GENERAL SUPPLY STERILE: Performed by: ORTHOPAEDIC SURGERY

## 2021-03-29 PROCEDURE — 80048 BASIC METABOLIC PNL TOTAL CA: CPT | Performed by: INTERNAL MEDICINE

## 2021-03-29 PROCEDURE — 0QSJXZZ REPOSITION RIGHT FIBULA, EXTERNAL APPROACH: ICD-10-PCS | Performed by: ORTHOPAEDIC SURGERY

## 2021-03-29 PROCEDURE — 250N000011 HC RX IP 250 OP 636: Performed by: ORTHOPAEDIC SURGERY

## 2021-03-29 PROCEDURE — 250N000009 HC RX 250: Performed by: NURSE ANESTHETIST, CERTIFIED REGISTERED

## 2021-03-29 PROCEDURE — 36415 COLL VENOUS BLD VENIPUNCTURE: CPT | Performed by: INTERNAL MEDICINE

## 2021-03-29 PROCEDURE — 250N000011 HC RX IP 250 OP 636: Performed by: ANESTHESIOLOGY

## 2021-03-29 DEVICE — IMPLANTABLE DEVICE: Type: IMPLANTABLE DEVICE | Site: LEG | Status: FUNCTIONAL

## 2021-03-29 DEVICE — 5.0MM TI LOCKING SCREW W/T25 STARDRIVE 36MM F/IM NAIL-STER: Type: IMPLANTABLE DEVICE | Site: LEG | Status: FUNCTIONAL

## 2021-03-29 DEVICE — 5.0MM TI LOCKING SCREW W/T25 STARDRIVE 32MM F/IM NAIL-STER: Type: IMPLANTABLE DEVICE | Site: LEG | Status: FUNCTIONAL

## 2021-03-29 DEVICE — 5.0MM TI LOCKING SCREW W/T25 STARDRIVE 40MM F/IM NAIL-STER: Type: IMPLANTABLE DEVICE | Site: LEG | Status: FUNCTIONAL

## 2021-03-29 DEVICE — 10MM TI CANNULATED TIBIAL NAIL-EX/345MM-STERILE
Type: IMPLANTABLE DEVICE | Site: LEG | Status: FUNCTIONAL
Brand: EXPERT NAIL

## 2021-03-29 RX ORDER — SODIUM CHLORIDE, SODIUM LACTATE, POTASSIUM CHLORIDE, CALCIUM CHLORIDE 600; 310; 30; 20 MG/100ML; MG/100ML; MG/100ML; MG/100ML
INJECTION, SOLUTION INTRAVENOUS CONTINUOUS
Status: DISCONTINUED | OUTPATIENT
Start: 2021-03-29 | End: 2021-03-29 | Stop reason: HOSPADM

## 2021-03-29 RX ORDER — CYCLOBENZAPRINE HCL 10 MG
10 TABLET ORAL EVERY 8 HOURS PRN
Qty: 30 TABLET | Refills: 0 | Status: SHIPPED | OUTPATIENT
Start: 2021-03-29

## 2021-03-29 RX ORDER — IBUPROFEN 600 MG/1
600 TABLET, FILM COATED ORAL EVERY 6 HOURS PRN
COMMUNITY
Start: 2021-03-29

## 2021-03-29 RX ORDER — ONDANSETRON 2 MG/ML
INJECTION INTRAMUSCULAR; INTRAVENOUS PRN
Status: DISCONTINUED | OUTPATIENT
Start: 2021-03-29 | End: 2021-03-29

## 2021-03-29 RX ORDER — CEFAZOLIN SODIUM 2 G/100ML
2 INJECTION, SOLUTION INTRAVENOUS
Status: DISCONTINUED | OUTPATIENT
Start: 2021-03-29 | End: 2021-03-29 | Stop reason: HOSPADM

## 2021-03-29 RX ORDER — CEFAZOLIN SODIUM 1 G/3ML
1 INJECTION, POWDER, FOR SOLUTION INTRAMUSCULAR; INTRAVENOUS EVERY 8 HOURS
Status: COMPLETED | OUTPATIENT
Start: 2021-03-29 | End: 2021-03-30

## 2021-03-29 RX ORDER — HYDROMORPHONE HYDROCHLORIDE 1 MG/ML
.3-.5 INJECTION, SOLUTION INTRAMUSCULAR; INTRAVENOUS; SUBCUTANEOUS EVERY 10 MIN PRN
Status: DISCONTINUED | OUTPATIENT
Start: 2021-03-29 | End: 2021-03-29 | Stop reason: HOSPADM

## 2021-03-29 RX ORDER — POLYETHYLENE GLYCOL 3350 17 G/17G
17 POWDER, FOR SOLUTION ORAL DAILY PRN
COMMUNITY
Start: 2021-03-29

## 2021-03-29 RX ORDER — FENTANYL CITRATE 50 UG/ML
25-50 INJECTION, SOLUTION INTRAMUSCULAR; INTRAVENOUS
Status: DISCONTINUED | OUTPATIENT
Start: 2021-03-29 | End: 2021-03-29 | Stop reason: HOSPADM

## 2021-03-29 RX ORDER — DOCUSATE SODIUM 100 MG/1
100 CAPSULE, LIQUID FILLED ORAL 2 TIMES DAILY
Status: DISCONTINUED | OUTPATIENT
Start: 2021-03-29 | End: 2021-03-30 | Stop reason: HOSPADM

## 2021-03-29 RX ORDER — PROPOFOL 10 MG/ML
INJECTION, EMULSION INTRAVENOUS CONTINUOUS PRN
Status: DISCONTINUED | OUTPATIENT
Start: 2021-03-29 | End: 2021-03-29

## 2021-03-29 RX ORDER — HYDROMORPHONE HCL IN WATER/PF 6 MG/30 ML
0.2 PATIENT CONTROLLED ANALGESIA SYRINGE INTRAVENOUS
Status: DISCONTINUED | OUTPATIENT
Start: 2021-03-29 | End: 2021-03-30 | Stop reason: HOSPADM

## 2021-03-29 RX ORDER — PROCHLORPERAZINE MALEATE 5 MG
10 TABLET ORAL EVERY 6 HOURS PRN
Status: DISCONTINUED | OUTPATIENT
Start: 2021-03-29 | End: 2021-03-30 | Stop reason: HOSPADM

## 2021-03-29 RX ORDER — DEXAMETHASONE SODIUM PHOSPHATE 4 MG/ML
INJECTION, SOLUTION INTRA-ARTICULAR; INTRALESIONAL; INTRAMUSCULAR; INTRAVENOUS; SOFT TISSUE PRN
Status: DISCONTINUED | OUTPATIENT
Start: 2021-03-29 | End: 2021-03-29

## 2021-03-29 RX ORDER — METHOCARBAMOL 750 MG/1
750 TABLET, FILM COATED ORAL EVERY 6 HOURS PRN
Status: DISCONTINUED | OUTPATIENT
Start: 2021-03-29 | End: 2021-03-30 | Stop reason: HOSPADM

## 2021-03-29 RX ORDER — SODIUM CHLORIDE, SODIUM LACTATE, POTASSIUM CHLORIDE, CALCIUM CHLORIDE 600; 310; 30; 20 MG/100ML; MG/100ML; MG/100ML; MG/100ML
INJECTION, SOLUTION INTRAVENOUS CONTINUOUS
Status: DISCONTINUED | OUTPATIENT
Start: 2021-03-29 | End: 2021-03-30 | Stop reason: HOSPADM

## 2021-03-29 RX ORDER — KETAMINE HYDROCHLORIDE 10 MG/ML
INJECTION INTRAMUSCULAR; INTRAVENOUS PRN
Status: DISCONTINUED | OUTPATIENT
Start: 2021-03-29 | End: 2021-03-29

## 2021-03-29 RX ORDER — LIDOCAINE 40 MG/G
CREAM TOPICAL
Status: DISCONTINUED | OUTPATIENT
Start: 2021-03-29 | End: 2021-03-29 | Stop reason: HOSPADM

## 2021-03-29 RX ORDER — ONDANSETRON 4 MG/1
4 TABLET, ORALLY DISINTEGRATING ORAL EVERY 6 HOURS PRN
Status: DISCONTINUED | OUTPATIENT
Start: 2021-03-29 | End: 2021-03-29

## 2021-03-29 RX ORDER — PROPOFOL 10 MG/ML
INJECTION, EMULSION INTRAVENOUS PRN
Status: DISCONTINUED | OUTPATIENT
Start: 2021-03-29 | End: 2021-03-29

## 2021-03-29 RX ORDER — AMOXICILLIN 250 MG
1 CAPSULE ORAL 2 TIMES DAILY PRN
Qty: 30 TABLET | Refills: 0 | Status: SHIPPED | OUTPATIENT
Start: 2021-03-29

## 2021-03-29 RX ORDER — ONDANSETRON 4 MG/1
4 TABLET, ORALLY DISINTEGRATING ORAL EVERY 30 MIN PRN
Status: DISCONTINUED | OUTPATIENT
Start: 2021-03-29 | End: 2021-03-29 | Stop reason: HOSPADM

## 2021-03-29 RX ORDER — ACETAMINOPHEN 325 MG/1
650 TABLET ORAL EVERY 6 HOURS PRN
COMMUNITY
Start: 2021-03-29

## 2021-03-29 RX ORDER — HYDROXYZINE HYDROCHLORIDE 25 MG/1
25 TABLET, FILM COATED ORAL EVERY 6 HOURS PRN
Status: DISCONTINUED | OUTPATIENT
Start: 2021-03-29 | End: 2021-03-30 | Stop reason: HOSPADM

## 2021-03-29 RX ORDER — ACETAMINOPHEN 325 MG/1
975 TABLET ORAL EVERY 8 HOURS
Status: DISCONTINUED | OUTPATIENT
Start: 2021-03-29 | End: 2021-03-30 | Stop reason: HOSPADM

## 2021-03-29 RX ORDER — CEFAZOLIN SODIUM 2 G/100ML
2 INJECTION, SOLUTION INTRAVENOUS SEE ADMIN INSTRUCTIONS
Status: DISCONTINUED | OUTPATIENT
Start: 2021-03-29 | End: 2021-03-29 | Stop reason: HOSPADM

## 2021-03-29 RX ORDER — OXYCODONE HYDROCHLORIDE 5 MG/1
10 TABLET ORAL EVERY 4 HOURS PRN
Status: DISCONTINUED | OUTPATIENT
Start: 2021-03-29 | End: 2021-03-30 | Stop reason: HOSPADM

## 2021-03-29 RX ORDER — OXYCODONE HYDROCHLORIDE 5 MG/1
5 TABLET ORAL EVERY 4 HOURS PRN
Status: DISCONTINUED | OUTPATIENT
Start: 2021-03-29 | End: 2021-03-30 | Stop reason: HOSPADM

## 2021-03-29 RX ORDER — ONDANSETRON 4 MG/1
4 TABLET, ORALLY DISINTEGRATING ORAL EVERY 6 HOURS PRN
Qty: 5 TABLET | Refills: 0 | Status: SHIPPED | OUTPATIENT
Start: 2021-03-29

## 2021-03-29 RX ORDER — POLYETHYLENE GLYCOL 3350 17 G/17G
17 POWDER, FOR SOLUTION ORAL DAILY
Status: DISCONTINUED | OUTPATIENT
Start: 2021-03-30 | End: 2021-03-30 | Stop reason: HOSPADM

## 2021-03-29 RX ORDER — AMOXICILLIN 250 MG
1 CAPSULE ORAL 2 TIMES DAILY
Status: DISCONTINUED | OUTPATIENT
Start: 2021-03-29 | End: 2021-03-30 | Stop reason: HOSPADM

## 2021-03-29 RX ORDER — PROCHLORPERAZINE 25 MG
25 SUPPOSITORY, RECTAL RECTAL EVERY 12 HOURS PRN
Status: DISCONTINUED | OUTPATIENT
Start: 2021-03-29 | End: 2021-03-30 | Stop reason: HOSPADM

## 2021-03-29 RX ORDER — HYDROMORPHONE HYDROCHLORIDE 1 MG/ML
0.4 INJECTION, SOLUTION INTRAMUSCULAR; INTRAVENOUS; SUBCUTANEOUS
Status: DISCONTINUED | OUTPATIENT
Start: 2021-03-29 | End: 2021-03-30 | Stop reason: HOSPADM

## 2021-03-29 RX ORDER — ONDANSETRON 2 MG/ML
4 INJECTION INTRAMUSCULAR; INTRAVENOUS EVERY 30 MIN PRN
Status: DISCONTINUED | OUTPATIENT
Start: 2021-03-29 | End: 2021-03-29 | Stop reason: HOSPADM

## 2021-03-29 RX ORDER — ACETAMINOPHEN 325 MG/1
650 TABLET ORAL EVERY 4 HOURS PRN
Status: DISCONTINUED | OUTPATIENT
Start: 2021-04-01 | End: 2021-03-30 | Stop reason: HOSPADM

## 2021-03-29 RX ORDER — ASPIRIN 325 MG
325 TABLET, DELAYED RELEASE (ENTERIC COATED) ORAL DAILY
Qty: 30 TABLET | Refills: 0 | Status: SHIPPED | OUTPATIENT
Start: 2021-03-29

## 2021-03-29 RX ORDER — LIDOCAINE 40 MG/G
CREAM TOPICAL
Status: DISCONTINUED | OUTPATIENT
Start: 2021-03-29 | End: 2021-03-30 | Stop reason: HOSPADM

## 2021-03-29 RX ORDER — OXYCODONE HYDROCHLORIDE 5 MG/1
5-10 TABLET ORAL
Qty: 40 TABLET | Refills: 0 | Status: SHIPPED | OUTPATIENT
Start: 2021-03-29

## 2021-03-29 RX ORDER — PROCHLORPERAZINE MALEATE 5 MG
10 TABLET ORAL EVERY 6 HOURS PRN
Status: DISCONTINUED | OUTPATIENT
Start: 2021-03-29 | End: 2021-03-29

## 2021-03-29 RX ORDER — BISACODYL 10 MG
10 SUPPOSITORY, RECTAL RECTAL DAILY PRN
Status: DISCONTINUED | OUTPATIENT
Start: 2021-03-29 | End: 2021-03-30 | Stop reason: HOSPADM

## 2021-03-29 RX ORDER — MEPERIDINE HYDROCHLORIDE 25 MG/ML
12.5 INJECTION INTRAMUSCULAR; INTRAVENOUS; SUBCUTANEOUS
Status: DISCONTINUED | OUTPATIENT
Start: 2021-03-29 | End: 2021-03-29 | Stop reason: HOSPADM

## 2021-03-29 RX ORDER — FENTANYL CITRATE 50 UG/ML
INJECTION, SOLUTION INTRAMUSCULAR; INTRAVENOUS PRN
Status: DISCONTINUED | OUTPATIENT
Start: 2021-03-29 | End: 2021-03-29

## 2021-03-29 RX ORDER — ONDANSETRON 2 MG/ML
4 INJECTION INTRAMUSCULAR; INTRAVENOUS EVERY 6 HOURS PRN
Status: DISCONTINUED | OUTPATIENT
Start: 2021-03-29 | End: 2021-03-29

## 2021-03-29 RX ORDER — BUPIVACAINE HYDROCHLORIDE AND EPINEPHRINE 5; 5 MG/ML; UG/ML
INJECTION, SOLUTION EPIDURAL; INTRACAUDAL; PERINEURAL PRN
Status: DISCONTINUED | OUTPATIENT
Start: 2021-03-29 | End: 2021-03-29 | Stop reason: HOSPADM

## 2021-03-29 RX ADMIN — SODIUM CHLORIDE, POTASSIUM CHLORIDE, SODIUM LACTATE AND CALCIUM CHLORIDE: 600; 310; 30; 20 INJECTION, SOLUTION INTRAVENOUS at 09:18

## 2021-03-29 RX ADMIN — MIDAZOLAM 2 MG: 1 INJECTION INTRAMUSCULAR; INTRAVENOUS at 09:18

## 2021-03-29 RX ADMIN — CEFAZOLIN SODIUM 2 G: 2 INJECTION, SOLUTION INTRAVENOUS at 09:29

## 2021-03-29 RX ADMIN — ASPIRIN 325 MG: 325 TABLET, COATED ORAL at 20:01

## 2021-03-29 RX ADMIN — FENTANYL CITRATE 50 MCG: 50 INJECTION, SOLUTION INTRAMUSCULAR; INTRAVENOUS at 09:21

## 2021-03-29 RX ADMIN — HYDROMORPHONE HYDROCHLORIDE 0.5 MG: 1 INJECTION, SOLUTION INTRAMUSCULAR; INTRAVENOUS; SUBCUTANEOUS at 12:49

## 2021-03-29 RX ADMIN — Medication 30 MG: at 09:34

## 2021-03-29 RX ADMIN — ONDANSETRON HYDROCHLORIDE 4 MG: 2 INJECTION, SOLUTION INTRAVENOUS at 09:29

## 2021-03-29 RX ADMIN — ACETAMINOPHEN 975 MG: 325 TABLET, FILM COATED ORAL at 22:34

## 2021-03-29 RX ADMIN — ACETAMINOPHEN 975 MG: 325 TABLET, FILM COATED ORAL at 15:20

## 2021-03-29 RX ADMIN — HYDROMORPHONE HYDROCHLORIDE 0.5 MG: 1 INJECTION, SOLUTION INTRAMUSCULAR; INTRAVENOUS; SUBCUTANEOUS at 09:40

## 2021-03-29 RX ADMIN — OXYCODONE HYDROCHLORIDE 5 MG: 5 TABLET ORAL at 15:20

## 2021-03-29 RX ADMIN — PROPOFOL 40 MCG/KG/MIN: 10 INJECTION, EMULSION INTRAVENOUS at 09:42

## 2021-03-29 RX ADMIN — FENTANYL CITRATE 50 MCG: 50 INJECTION, SOLUTION INTRAMUSCULAR; INTRAVENOUS at 12:34

## 2021-03-29 RX ADMIN — CEFAZOLIN 1 G: 1 INJECTION, POWDER, FOR SOLUTION INTRAMUSCULAR; INTRAVENOUS at 17:56

## 2021-03-29 RX ADMIN — DOCUSATE SODIUM 100 MG: 100 CAPSULE, LIQUID FILLED ORAL at 20:01

## 2021-03-29 RX ADMIN — CEFAZOLIN SODIUM 2 G: 2 INJECTION, SOLUTION INTRAVENOUS at 05:33

## 2021-03-29 RX ADMIN — FENTANYL CITRATE 50 MCG: 50 INJECTION, SOLUTION INTRAMUSCULAR; INTRAVENOUS at 09:25

## 2021-03-29 RX ADMIN — METHOCARBAMOL 750 MG: 750 TABLET ORAL at 18:29

## 2021-03-29 RX ADMIN — DOCUSATE SODIUM 50 MG AND SENNOSIDES 8.6 MG 1 TABLET: 8.6; 5 TABLET, FILM COATED ORAL at 20:02

## 2021-03-29 RX ADMIN — IBUPROFEN 600 MG: 600 TABLET ORAL at 01:12

## 2021-03-29 RX ADMIN — FENTANYL CITRATE 50 MCG: 50 INJECTION, SOLUTION INTRAMUSCULAR; INTRAVENOUS at 12:27

## 2021-03-29 RX ADMIN — LIDOCAINE HYDROCHLORIDE 30 MG: 10 INJECTION, SOLUTION EPIDURAL; INFILTRATION; INTRACAUDAL; PERINEURAL at 09:25

## 2021-03-29 RX ADMIN — CYCLOBENZAPRINE HYDROCHLORIDE 10 MG: 10 TABLET, FILM COATED ORAL at 01:01

## 2021-03-29 RX ADMIN — SODIUM CHLORIDE, POTASSIUM CHLORIDE, SODIUM LACTATE AND CALCIUM CHLORIDE: 600; 310; 30; 20 INJECTION, SOLUTION INTRAVENOUS at 12:27

## 2021-03-29 RX ADMIN — IBUPROFEN 600 MG: 600 TABLET ORAL at 18:29

## 2021-03-29 RX ADMIN — HYDROMORPHONE HYDROCHLORIDE 0.5 MG: 1 INJECTION, SOLUTION INTRAMUSCULAR; INTRAVENOUS; SUBCUTANEOUS at 09:47

## 2021-03-29 RX ADMIN — HYDROMORPHONE HYDROCHLORIDE 0.3 MG: 1 INJECTION, SOLUTION INTRAMUSCULAR; INTRAVENOUS; SUBCUTANEOUS at 01:01

## 2021-03-29 RX ADMIN — DEXAMETHASONE SODIUM PHOSPHATE 4 MG: 4 INJECTION, SOLUTION INTRA-ARTICULAR; INTRALESIONAL; INTRAMUSCULAR; INTRAVENOUS; SOFT TISSUE at 09:25

## 2021-03-29 RX ADMIN — OXYCODONE HYDROCHLORIDE 10 MG: 5 TABLET ORAL at 20:02

## 2021-03-29 RX ADMIN — PROPOFOL 200 MG: 10 INJECTION, EMULSION INTRAVENOUS at 09:25

## 2021-03-29 ASSESSMENT — ACTIVITIES OF DAILY LIVING (ADL)
ADLS_ACUITY_SCORE: 14

## 2021-03-29 ASSESSMENT — MIFFLIN-ST. JEOR: SCORE: 1745.51

## 2021-03-29 ASSESSMENT — LIFESTYLE VARIABLES: TOBACCO_USE: 1

## 2021-03-29 NOTE — PLAN OF CARE
Pt vss arrived to the floor around 1300, lungs are clear, pt has been encouraged to use IS hourly. Awake and alert   BS hypoactive, eating and drinking well ordered lunch.  CMS+ +2 pulse on splinted foot, dressing CDI, and orders to keep ice and elevated on wedge. Due to dangle on evenings.  Voiding using urinal  Pain controlled at this time, will give dose of Oxycodone as pain is creeping up.   Following care plan and pt was educated on care plan expectations  Plans to d/c to home hopefully tomorrow per patient.

## 2021-03-29 NOTE — UTILIZATION REVIEW
[facility] Utilization Review  Admission Status; Secondary Review Determination     Admission Date: 3/28/2021 11:41 AM      Under the authority of the Utilization Management Committee, the utilization review process indicated a secondary review on the above patient.  The review outcome is based on review of the medical records, discussions with staff, and applying clinical experience noted on the date of the review.        (X)      Inpatient Status Appropriate - This patient's medical care is consistent with medical management for inpatient care and reasonable inpatient medical practice.          RATIONALE FOR DETERMINATION   30-year-old male admitted after trauma to right leg while playing soccer, large crack and found to have fracture of right lower extremity with right mid tibia and fibula fracture, right shin ulcerated wound.  Started on IV Ancef, status post open reduction internal fixation of right proximal tibia fracture, right midshaft comminuted tibia fracture, sharp excisional debridement of skin, subcutaneous tissue and curettage of bone, closed manipulation and nonoperative management of right midshaft fibula fracture with application of short leg splint.  Complex patient with right tibia fracture status post surgical intervention with wound as well, needs IV antibiotics, aggressive pain control, therapy, close monitoring in the hospital with ongoing interventions, anticipate more than 2 midnight hospital stay, recommend continue inpatient status      The severity of illness, intensity of service provided, expected LOS and risk for adverse outcome make the care complex, high risk and appropriate for hospital admission.The patient requires hospital based medical care which is anticipated to require a stay of 2 or more midnights; according to CMS guidelines the patient should be admitted as inpatient        The information on this document is developed by the utilization review team in order  for the business office to ensure compliance.  This only denotes the appropriateness of proper admission status and does not reflect the quality of care rendered.         The definitions of Inpatient Status and Observation Status used in making the determination above are those provided in the CMS Coverage Manual, Chapter 1 and Chapter 6, section 70.4.      Sincerely,       Toni Gordillo MD  Physician Advisor  Utilization Review-[place]    Phone: [phone]

## 2021-03-29 NOTE — ANESTHESIA CARE TRANSFER NOTE
Patient: Tristen Hernadnez    Procedure(s):  OPEN REDUCTION INTERNAL FIXATION RIGHT TIBIA/FIBULA FRACTURE WITH INTRAMEDULLARY NAIL. I and D of open fracture.    Diagnosis: Closed fracture of right tibia and fibula [S82.201A, S82.401A]  Diagnosis Additional Information: No value filed.    Anesthesia Type:   General     Note:    Oropharynx: nasal airway in place (30 German placed left nostril without difficutly)  Level of Consciousness: drowsy  Oxygen Supplementation: face mask  Level of Supplemental Oxygen (L/min / FiO2): 6  Independent Airway: airway patency satisfactory and stable  Dentition: dentition unchanged  Vital Signs Stable: post-procedure vital signs reviewed and stable  Report to RN Given: handoff report given  Patient transferred to: PACU    Handoff Report: Identifed the Patient, Identified the Reponsible Provider, Reviewed the pertinent medical history, Discussed the surgical course, Reviewed Intra-OP anesthesia mangement and issues during anesthesia, Set expectations for post-procedure period and Allowed opportunity for questions and acknowledgement of understanding      Vitals: (Last set prior to Anesthesia Care Transfer)  CRNA VITALS  3/29/2021 1100 - 3/29/2021 1143      3/29/2021             Pulse:  74    SpO2:  99 %    Resp Rate (observed):  (!) 1        Electronically Signed By: HUNTER Szymanski CRNA  March 29, 2021  11:43 AM

## 2021-03-29 NOTE — PLAN OF CARE
Vital signs stable.  Lungs clear, encouraged inspirometer use.  Bowel sounds hypoactive,  CMS intact, right leg elevated on pillows, ice pack applied, ace wrap with splint intact.Pt  can wiggle toes, strong popiteal pulse,  Pt is Bi lingual,  I pad in room for  services.  Pain controlled with flexaril, ibuprofen, tylenol decline narcotic at this time..  Surgical scrub bath done.  Voids per urinal.  Npo after midnight, Surgery in am.  Plan of care reviewed with pt and spouse.

## 2021-03-29 NOTE — PROGRESS NOTES
"[facility]  Hospitalist Progress Note  Golden Faust MD 03/29/2021    Reason for Stay (Diagnosis): Right Tib/Fib fracture         Assessment and Plan:      Summary of Stay: Tristen Hernandez is a 30 year old male presented following a trauma to his right leg while playing soccer and found to have tib/ fib fracture and admitted to the hospital. He underwent ORIF on 03/29 and is immediate post operative.  1.  Right mid-tibia and fibular fracture.   2.  Right shin ulcerated wound, possible type 1 open.   -Has superficial ulceration on admission.   -Immediate post op s/p ORIF right Tib/Fib.  -Pain control, DVT prophylaxis, eight bearing status, wound care, per Orthopedics.  -Monitor post op per protocol.  -He was on  Ancef after admission for possible open/ulcer area. Further need for antibiotics per ortho.     DVT Prophylaxis: Defer to Orthopedics  Code Status: Full Code  Discharge Dispo: Home  Estimated Disch Date / # of Days until Disch:per orthopedics recommendation.  I discussed with Patient and his wife at bedside.      Interval History (Subjective):      Patient seen and examined, just back from OR, pan is controlled, no new issues. His wifer at bedside.                  Physical Exam:      Last Vital Signs:  /64 (BP Location: Right arm)   Pulse 60   Temp 98  F (36.7  C) (Temporal)   Resp 12   Ht 1.778 m (5' 10\")   Wt 78.9 kg (174 lb)   SpO2 99%   BMI 24.97 kg/m      Wt Readings from Last 1 Encounters:   03/29/21 78.9 kg (174 lb)     Current Facility-Administered Medications   Medication     [START ON 4/1/2021] acetaminophen (TYLENOL) tablet 650 mg     acetaminophen (TYLENOL) tablet 975 mg     aspirin (ASA) EC tablet 325 mg     benzocaine-menthol (CHLORASEPTIC) 6-10 MG lozenge 1 lozenge     bisacodyl (DULCOLAX) Suppository 10 mg     ceFAZolin (ANCEF) 1 g vial to attach to  ml bag for ADULT or 50 ml bag for PEDS     ceFAZolin (ANCEF) intermittent infusion 2 g in 100 mL " dextrose PRE-MIX     cyclobenzaprine (FLEXERIL) tablet 10 mg     docusate sodium (COLACE) capsule 100 mg     HYDROmorphone (DILAUDID) injection 0.2 mg    Or     HYDROmorphone (PF) (DILAUDID) injection 0.4 mg     hydrOXYzine (ATARAX) tablet 25 mg     ibuprofen (ADVIL/MOTRIN) tablet 600 mg     lactated ringers infusion     lidocaine (LMX4) cream     lidocaine (LMX4) cream     lidocaine 1 % 0.1-1 mL     lidocaine 1 % 0.1-1 mL     magnesium hydroxide (MILK OF MAGNESIA) suspension 30 mL     melatonin tablet 1 mg     methocarbamol (ROBAXIN) tablet 750 mg     naloxone (NARCAN) injection 0.2 mg    Or     naloxone (NARCAN) injection 0.4 mg    Or     naloxone (NARCAN) injection 0.2 mg    Or     naloxone (NARCAN) injection 0.4 mg     ondansetron (ZOFRAN-ODT) ODT tab 4 mg    Or     ondansetron (ZOFRAN) injection 4 mg     oxyCODONE (ROXICODONE) tablet 5 mg    Or     oxyCODONE (ROXICODONE) tablet 10 mg     [START ON 3/30/2021] polyethylene glycol (MIRALAX) Packet 17 g     polyethylene glycol (MIRALAX) Packet 17 g     prochlorperazine (COMPAZINE) tablet 10 mg    Or     prochlorperazine (COMPAZINE) injection 10 mg    Or     prochlorperazine (COMPAZINE) suppository 25 mg     senna-docusate (SENOKOT-S/PERICOLACE) 8.6-50 MG per tablet 1 tablet     senna-docusate (SENOKOT-S/PERICOLACE) 8.6-50 MG per tablet 1 tablet    Or     senna-docusate (SENOKOT-S/PERICOLACE) 8.6-50 MG per tablet 2 tablet     sodium chloride (PF) 0.9% PF flush 3 mL     sodium chloride (PF) 0.9% PF flush 3 mL     sodium chloride (PF) 0.9% PF flush 3 mL     sodium chloride (PF) 0.9% PF flush 3 mL     sodium chloride (PF) 0.9% PF flush 3 mL     sodium chloride (PF) 0.9% PF flush 3 mL     sodium chloride (PF) 0.9% PF flush 3 mL     sodium phosphate (FLEET ENEMA) 1 enema       Constitutional: Awake, alert, cooperative, no apparent distress   Respiratory: Clear to auscultation bilaterally, no crackles or wheezing   Cardiovascular: Regular rate and rhythm, normal S1 and  S2, and no murmur noted   Abdomen: Normal bowel sounds, soft, non-distended, non-tender   Skin: No rashes, no cyanosis, dry to touch   Neuro: Alert and oriented x3, no weakness, numbness, memory loss   Extremities: Left leg No edema, normal range of motion, Rt leg, post op wrapped, normal capillary refills.   Other(s):HEENT Pink, un icteric.       All other systems: Negative          Medications:      All current medications were reviewed with changes reflected in problem list.         Data:      All new lab and imaging data was reviewed.   Labs:  Recent Labs   Lab 03/29/21  0744      POTASSIUM 4.6   CHLORIDE 106   CO2 26   ANIONGAP 6   GLC 92   BUN 22   CR 1.01   GFRESTIMATED >90   GFRESTBLACK >90   VARINDER 8.3*     Recent Labs   Lab 03/29/21  0744   WBC 9.4   HGB 15.0   HCT 45.6   MCV 96         Imaging:   Results for orders placed or performed during the hospital encounter of 03/28/21   XR Tibia & Fibula Right 2 Views    Narrative    TIBIA AND FIBULA RIGHT TWO VIEWS    3/28/2021 12:32 PM     HISTORY:  Tibia/fibula fracture, mid shin from playing soccer.      Impression    IMPRESSION:  1. Comminuted fracture involving the mid shaft of the tibia with 1 cm  of displacement and moderate apex medial and mild apex anterior  angulation.  2. Nondisplaced oblique fracture in the proximal shaft of the tibia.  3. Comminuted fracture at the junction of the proximal and middle  thirds of the shaft of the fibula with 29 mm of displacement and mild  apex medial and anterior angulation. There is also up to 1 cm of  bayonetting.    DADA MIX MD   Ankle XR, G/E 3 views, right    Narrative    ANKLE RIGHT THREE OR MORE VIEWS    3/28/2021 12:33 PM     HISTORY:  Soccer injury, rule out fracture      Impression    IMPRESSION: There are known diaphyseal fractures in the tibia and  fibula. Ankle radiographs show no other abnormalities.    DADA MIX MD   XR Surgery GABO L/T 5 Min Fluoro w Stills    Narrative    This  exam was marked as non-reportable because it will not be read by a   radiologist or a [facility] non-radiologist provider.

## 2021-03-29 NOTE — PLAN OF CARE
Pt A/O, afebrile. Pain managed with Flexeril, Ibuprofen, and IV Dilaudid. Pt is bilingual, ipad in the room for interpretation services. NPO since midnight. RE is splinted and elevated. UTV abrasion to right shin. Plan for surgery today at 0845

## 2021-03-29 NOTE — ANESTHESIA PREPROCEDURE EVALUATION
Anesthesia Pre-Procedure Evaluation    Patient: Tristen Hernandez   MRN: [id number] : 1990        Preoperative Diagnosis: Closed fracture of right tibia and fibula [S82.201A, S82.401A]   Procedure : Procedure(s):  OPEN REDUCTION INTERNAL FIXATION RIGHT TIBIA/FIBULA FRACTURE WITH INTRAMEDULLARY NAIL     No past medical history on file.   No past surgical history on file.   No Known Allergies   Social History     Tobacco Use     Smoking status: Current Every Day Smoker     Smokeless tobacco: Never Used   Substance Use Topics     Alcohol use: No     Frequency: Never      Wt Readings from Last 1 Encounters:   21 78.9 kg (174 lb)        Anesthesia Evaluation            ROS/MED HX  ENT/Pulmonary:     (+) tobacco use, Current use,     Neurologic:  - neg neurologic ROS     Cardiovascular:  - neg cardiovascular ROS     METS/Exercise Tolerance:     Hematologic:  - neg hematologic  ROS     Musculoskeletal:   (+) fracture, Fracture location: RLE,     GI/Hepatic:  - neg GI/hepatic ROS     Renal/Genitourinary:  - neg Renal ROS     Endo:  - neg endo ROS     Psychiatric/Substance Use:  - neg psychiatric ROS     Infectious Disease:  - neg infectious disease ROS     Malignancy:  - neg malignancy ROS     Other:            Physical Exam    Airway        Mallampati: II   TM distance: > 3 FB   Neck ROM: full   Mouth opening: > 3 cm    Respiratory Devices and Support         Dental           Cardiovascular   cardiovascular exam normal          Pulmonary   pulmonary exam normal            Other findings: Lab Test        21                       0744          0017          WBC          9.4           --           HGB          15.0          --           MCV          96            --           PLT          215           --           INR           --          1.05           Lab Test        21                       0744          NA           138           POTASSIUM    4.6           CHLORIDE     106            CO2          26            BUN          22            CR           1.01          ANIONGAP     6             VARINDER          8.3*          GLC          92              OUTSIDE LABS:  CBC:   Lab Results   Component Value Date    WBC 9.4 03/29/2021    HGB 15.0 03/29/2021    HCT 45.6 03/29/2021     03/29/2021     BMP:   Lab Results   Component Value Date     03/29/2021    POTASSIUM 4.6 03/29/2021    CHLORIDE 106 03/29/2021    CO2 26 03/29/2021    BUN 22 03/29/2021    CR 1.01 03/29/2021    GLC 92 03/29/2021     COAGS:   Lab Results   Component Value Date    INR 1.05 03/29/2021     POC: No results found for: BGM, HCG, HCGS  HEPATIC: No results found for: ALBUMIN, PROTTOTAL, ALT, AST, GGT, ALKPHOS, BILITOTAL, BILIDIRECT, EMMA  OTHER:   Lab Results   Component Value Date    VARINDER 8.3 (L) 03/29/2021       Anesthesia Plan    ASA Status:  2      Anesthesia Type: General.     - Airway: LMA   Induction: Propofol.   Maintenance: Balanced.        Consents    Anesthesia Plan(s) and associated risks, benefits, and realistic alternatives discussed. Questions answered and patient/representative(s) expressed understanding.     - Discussed with:  Patient      - Extended Intubation/Ventilatory Support Discussed: No.      - Patient is DNR/DNI Status: No    Use of blood products discussed: No .     Postoperative Care    Pain management: IV analgesics, Oral pain medications.   PONV prophylaxis: Ondansetron (or other 5HT-3), Dexamethasone or Solumedrol     Comments:    Propofol gtt + Sevo + 60% O2 please            Jovany Braden MD

## 2021-03-29 NOTE — ANESTHESIA POSTPROCEDURE EVALUATION
Patient: Tristen Hernandez    Procedure(s):  OPEN REDUCTION INTERNAL FIXATION RIGHT TIBIA/FIBULA FRACTURE WITH INTRAMEDULLARY NAIL. I and D of open fracture.    Diagnosis:Closed fracture of right tibia and fibula [S82.201A, S82.401A]  Diagnosis Additional Information: No value filed.    Anesthesia Type:  General    Note:  Disposition: Outpatient   Postop Pain Control: Uneventful            Sign Out: Well controlled pain   PONV: No   Neuro/Psych: Uneventful            Sign Out: Acceptable/Baseline neuro status   Airway/Respiratory: Uneventful            Sign Out: Acceptable/Baseline resp. status   CV/Hemodynamics: Uneventful            Sign Out: Acceptable CV status   Other NRE: NONE   DID A NON-ROUTINE EVENT OCCUR? No         Last vitals:  Vitals:    03/29/21 1300 03/29/21 1301 03/29/21 1305   BP:      Pulse: 76     Resp: 16 13    Temp:   99.1  F (37.3  C)   SpO2: 100% 99%        Last vitals prior to Anesthesia Care Transfer:  CRNA VITALS  3/29/2021 1100 - 3/29/2021 1200      3/29/2021             Pulse:  74    SpO2:  99 %    Resp Rate (observed):  (!) 1          Electronically Signed By: Dipesh Jansen MD  March 29, 2021  1:15 PM

## 2021-03-29 NOTE — BRIEF OP NOTE
[facility]    Brief Operative Note    Pre-operative diagnosis: Closed fracture of right tibia and fibula [S82.201A, S82.401A]  Post-operative diagnosis Type 1 open right tib/fib fx  Procedure: Procedure(s):  OPEN REDUCTION INTERNAL FIXATION RIGHT TIBIA/FIBULA FRACTURE WITH INTRAMEDULLARY NAIL. I and D of open fracture.  Surgeon: Surgeon(s) and Role:     * Luis Lynn MD - Primary  Anesthesia: General   Estimated blood loss: Less than 100 ml  Drains: None  Specimens: * No specimens in log *  Findings:   type 1 open fx tibia.  Complications: None.  Implants:   Implant Name Type Inv. Item Serial No.  Lot No. LRB No. Used Action   IMP NAIL SYN CAN TIBIAL [id number] [id number] Metallic Hardware/[place] IMP NAIL SYN CAN TIBIAL [id number] [id number]  [employer] [id number] Right 1 Implanted   IMP SCR SYN 5.0 TI LOCK T25 STARDRIVE 40MM [id number] Metallic Hardware/[place] IMP SCR SYN 5.0 TI LOCK T25 STARDRIVE 40MM [id number]  [employer]Kin[employer] [id number] Right 1 Implanted   IMP SCR SYN 5.0 TI LOCK T25 STARDRIVE 32MM [id number] Metallic Hardware/[place] IMP SCR SYN 5.0 TI LOCK T25 STARDRIVE 32MM 04.005.522S  SYNTHES-STRATEC [id number] Right 1 Implanted   5.0mm Titanium Dual Core Locking Screw with T25 StarDrive recess    SYNTHES 17 MAR 2021 Right 1 Implanted   IMP SCR SYN 5.0 TI LOCK T25 STARDRIVE 36MM 04.005.526S Metallic Hardware/[place] IMP SCR SYN 5.0 TI LOCK T25 STARDRIVE 36MM 04.005.526S  [employer] [id number] Right 1 Implanted     Plan:  TTWB RLE x 6 weeks  Elevate  Ancef x 24 H  OK for knee ROM  DVT prophy ASA  mg PO qday x 6 weeks  Vit D 2000 units daily  Follow up 2 weeks and 6 weeks  [facility]  [facility]    Monday 1-5 PM  and Thursday 7:30-12:00 AM\  [address] [address]  [phone]    [phone]    Or    Tuesday 7:30-5 PM  [address]    [address]  [place], [place]

## 2021-03-29 NOTE — CONSULTS
[facility]    Orthopedics Consultation    Date of Admission:  3/28/2021    Assessment & Plan   Tristen Hernandez is a 30 year old male who was admitted on 3/28/2021 with displaced right tib/fib fracture segmental, possible type I open.    Discussed both operative and nonoperative management.  Risks of surgery discussed included but not limited to bleeding, infection, damage to surrounding neurovascular structures, stiffness, malunion, delayed union, nonunion, need for revision surgery, blood clots, pulmonary embolus, stroke, anesthetic complications and even death.  No guarantees given or implied. Patient thoughtfully acknowledges risks and wishes to proceed with IM fixation of his tibia.  Interview conducted with .        Luis Lynn MD    Code Status    Full Code    Reason for Consult   Reason for consult: I was asked by Dr. Faust to evaluate this patient for right tib/fib fracture.    Primary Care Physician   Physician No Ref-Primary    History of Present Illness   Tristen Hernandez is a 30 year old male who presents with displaced right tib/fib fracture.  Amharic speaking male without any past medical history.  Playing soccer yesterday morning when he kicked the ball, and he happened kick the knee of another player and felt pain.  The patient's leg was swollen and deformed.  Abrasion to the right shin over the deformity with some opening and oozing blood.  Patient was given a dose of IV antibiotic and is being admitted to the hospital.  Denies cp or sob.  +Smoker.  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  Here today with his wife, no kids.    MEDS:   No current outpatient medications on file.       PAST MEDICAL HISTORY: History reviewed. No pertinent past medical history.    PAST SURGICAL HISTORY: History reviewed. No pertinent surgical history.    FAMILY HISTORY: History reviewed. No pertinent family history.    SOCIAL HISTORY:   Social History     Tobacco Use      Smoking status: Current Every Day Smoker     Smokeless tobacco: Never Used   Substance Use Topics     Alcohol use: No     Frequency: Never       ALLERGIES:  No Known Allergies    ROS:  10 point ROS neg other than the symptoms noted above in the HPI.      Physical Exam   Temp: 97.9  F (36.6  C) Temp src: Temporal BP: 119/73 Pulse: 70   Resp: 18 SpO2: 98 % O2 Device: None (Room air)    Vital Signs with Ranges  Temp:  [97.4  F (36.3  C)-98.6  F (37  C)] 97.9  F (36.6  C)  Pulse:  [55-96] 70  Resp:  [14-20] 18  BP: (106-125)/(59-73) 119/73  SpO2:  [93 %-100 %] 98 %  174 lbs 0 oz    Constitutional: Pleasant, alert, appropriate, following commands.  HEENT: Head atraumatic normocephalic. Pupils equal round and reactive to light.  Respiratory: Unlabored breathing no audible wheeze  Cardiovascular: Regular rate and rhythm  GI: Abdomen soft nontender nondistended.  Lymph/Hematologic: No lymphadenopathy in areas examined  Genitourinary:  No weiss  Skin: No rashes, no cyanosis, no edema.  Musculoskeletal: RLE splinted, wiggles toes, SILT, +2 dp pulse, no ttp thight.  BUE and LLE full painless rom.  Neurologic: normal without focal findings, mental status, speech normal, alert and oriented x iii, HARRY, reflexes normal and symmetric        Data   Results for orders placed or performed during the hospital encounter of 03/28/21 (from the past 24 hour(s))   XR Tibia & Fibula Right 2 Views    Narrative    TIBIA AND FIBULA RIGHT TWO VIEWS    3/28/2021 12:32 PM     HISTORY:  Tibia/fibula fracture, mid shin from playing soccer.      Impression    IMPRESSION:  1. Comminuted fracture involving the mid shaft of the tibia with 1 cm  of displacement and moderate apex medial and mild apex anterior  angulation.  2. Nondisplaced oblique fracture in the proximal shaft of the tibia.  3. Comminuted fracture at the junction of the proximal and middle  thirds of the shaft of the fibula with 29 mm of displacement and mild  apex medial and anterior  angulation. There is also up to 1 cm of  bayonetting.    DADA MIX MD   Ankle XR, G/E 3 views, right    Narrative    ANKLE RIGHT THREE OR MORE VIEWS    3/28/2021 12:33 PM     HISTORY:  Soccer injury, rule out fracture      Impression    IMPRESSION: There are known diaphyseal fractures in the tibia and  fibula. Ankle radiographs show no other abnormalities.    DADA MIX MD   Asymptomatic SARS-CoV-2 COVID-19 Virus (Coronavirus) by PCR    Specimen: Nasopharyngeal   Result Value Ref Range    SARS-CoV-2 Virus Specimen Source Nasopharyngeal     SARS-CoV-2 PCR Result NEGATIVE     SARS-CoV-2 PCR Comment (Note)    INR   Result Value Ref Range    INR 1.05 0.86 - 1.14   Basic metabolic panel   Result Value Ref Range    Sodium 138 133 - 144 mmol/L    Potassium 4.6 3.4 - 5.3 mmol/L    Chloride 106 94 - 109 mmol/L    Carbon Dioxide 26 20 - 32 mmol/L    Anion Gap 6 3 - 14 mmol/L    Glucose 92 70 - 99 mg/dL    Urea Nitrogen 22 7 - 30 mg/dL    Creatinine 1.01 0.66 - 1.25 mg/dL    GFR Estimate >90 >60 mL/min/[1.73_m2]    GFR Estimate If Black >90 >60 mL/min/[1.73_m2]    Calcium 8.3 (L) 8.5 - 10.1 mg/dL   CBC with platelets   Result Value Ref Range    WBC 9.4 4.0 - 11.0 10e9/L    RBC Count 4.74 4.4 - 5.9 10e12/L    Hemoglobin 15.0 13.3 - 17.7 g/dL    Hematocrit 45.6 40.0 - 53.0 %    MCV 96 78 - 100 fl    MCH 31.6 26.5 - 33.0 pg    MCHC 32.9 31.5 - 36.5 g/dL    RDW 13.2 10.0 - 15.0 %    Platelet Count 215 150 - 450 10e9/L

## 2021-03-30 ENCOUNTER — APPOINTMENT (OUTPATIENT)
Dept: PHYSICAL THERAPY | Facility: CLINIC | Age: 31
DRG: 494 | End: 2021-03-30

## 2021-03-30 VITALS
HEIGHT: 70 IN | WEIGHT: 174 LBS | RESPIRATION RATE: 16 BRPM | HEART RATE: 71 BPM | BODY MASS INDEX: 24.91 KG/M2 | SYSTOLIC BLOOD PRESSURE: 132 MMHG | OXYGEN SATURATION: 96 % | DIASTOLIC BLOOD PRESSURE: 43 MMHG | TEMPERATURE: 98.4 F

## 2021-03-30 LAB
GLUCOSE SERPL-MCNC: 100 MG/DL (ref 70–99)
HGB BLD-MCNC: 11.8 G/DL (ref 13.3–17.7)

## 2021-03-30 PROCEDURE — 82947 ASSAY GLUCOSE BLOOD QUANT: CPT | Performed by: INTERNAL MEDICINE

## 2021-03-30 PROCEDURE — 97161 PT EVAL LOW COMPLEX 20 MIN: CPT | Mod: GP | Performed by: PHYSICAL THERAPIST

## 2021-03-30 PROCEDURE — 97530 THERAPEUTIC ACTIVITIES: CPT | Mod: GP | Performed by: PHYSICAL THERAPIST

## 2021-03-30 PROCEDURE — 250N000011 HC RX IP 250 OP 636: Performed by: PHYSICIAN ASSISTANT

## 2021-03-30 PROCEDURE — 85018 HEMOGLOBIN: CPT | Performed by: PHYSICIAN ASSISTANT

## 2021-03-30 PROCEDURE — 250N000013 HC RX MED GY IP 250 OP 250 PS 637: Performed by: PHYSICIAN ASSISTANT

## 2021-03-30 PROCEDURE — 97116 GAIT TRAINING THERAPY: CPT | Mod: GP | Performed by: PHYSICAL THERAPIST

## 2021-03-30 PROCEDURE — 36415 COLL VENOUS BLD VENIPUNCTURE: CPT | Performed by: PHYSICIAN ASSISTANT

## 2021-03-30 PROCEDURE — 97110 THERAPEUTIC EXERCISES: CPT | Mod: GP | Performed by: PHYSICAL THERAPIST

## 2021-03-30 RX ADMIN — ASPIRIN 325 MG: 325 TABLET, COATED ORAL at 08:41

## 2021-03-30 RX ADMIN — DOCUSATE SODIUM 100 MG: 100 CAPSULE, LIQUID FILLED ORAL at 08:41

## 2021-03-30 RX ADMIN — OXYCODONE HYDROCHLORIDE 10 MG: 5 TABLET ORAL at 08:40

## 2021-03-30 RX ADMIN — POLYETHYLENE GLYCOL 3350 17 G: 17 POWDER, FOR SOLUTION ORAL at 08:41

## 2021-03-30 RX ADMIN — DOCUSATE SODIUM 50 MG AND SENNOSIDES 8.6 MG 1 TABLET: 8.6; 5 TABLET, FILM COATED ORAL at 08:41

## 2021-03-30 RX ADMIN — CEFAZOLIN 1 G: 1 INJECTION, POWDER, FOR SOLUTION INTRAMUSCULAR; INTRAVENOUS at 01:04

## 2021-03-30 RX ADMIN — ACETAMINOPHEN 975 MG: 325 TABLET, FILM COATED ORAL at 06:18

## 2021-03-30 ASSESSMENT — ACTIVITIES OF DAILY LIVING (ADL)
ADLS_ACUITY_SCORE: 14

## 2021-03-30 NOTE — PLAN OF CARE
Patient vital signs are at baseline: Yes  Patient able to ambulate as they were prior to admission or with assist devices provided by therapies during their stay:  Yes  Patient MUST void prior to discharge:  Yes  Patient able to tolerate oral intake:  Yes  Pain has adequate pain control using Oral analgesics:  Yes    Pt A&O x4 , VSS. Ambulating to bathroom with SBA and walker, Order for TTWB to RLE but easier for pt to do NWB. IVF stopped as pt eating and drinking adequately. Reports some numbness to R foot. RLE elevated on pillows. Splint CDI. Was having 8/10 pain but currently denies any pain after PRN ibuprofen, robaxin, and oxy along with scheduled tylenol. Pt plants to discharge home tomorrow pending therapy.

## 2021-03-30 NOTE — DISCHARGE SUMMARY
[facility]  Discharge Summary  Name: Tristen Hernandez    MRN: [id number]  YOB: 1990    Age: 30 year old  Date of Discharge:  3/30/2021 12:23 PM  Date of Admission: 3/28/2021  Primary Care Provider: No Ref-Primary, Physician  Discharge Physician:  Golden Faust MD.  Discharging Service:  Hospitalist      Discharge Diagnosis:  1.  Right mid-tibia and fibular fracture s/p ORIF.   2.  Right shin ulcerated wound, possible type 1 open wound.     Other Diagnosis:  None     Discharge Disposition:  Discharged to home     Allergies:  No Known Allergies     Discharge Medications:   Discharge Medication List as of 3/30/2021 11:30 AM      START taking these medications    Details   acetaminophen (TYLENOL) 325 MG tablet Take 2 tablets (650 mg) by mouth every 6 hours as needed for mild pain, OTC      aspirin (ASA) 325 MG EC tablet Take 1 tablet (325 mg) by mouth daily, Disp-30 tablet, R-0, E-Prescribe      cyclobenzaprine (FLEXERIL) 10 MG tablet Take 1 tablet (10 mg) by mouth every 8 hours as needed for muscle spasms, Disp-30 tablet, R-0, E-Prescribe      ibuprofen (ADVIL/MOTRIN) 600 MG tablet Take 1 tablet (600 mg) by mouth every 6 hours as needed for other (mild pain), OTC      ondansetron (ZOFRAN-ODT) 4 MG ODT tab Take 1 tablet (4 mg) by mouth every 6 hours as needed for nausea or vomiting, Disp-5 tablet, R-0, E-Prescribe      oxyCODONE (ROXICODONE) 5 MG tablet Take 1-2 tablets (5-10 mg) by mouth every 3 hours as needed for moderate to severe pain (one tab for moderate pain, two tabs for severe pain), Disp-40 tablet, R-0, Local Print      polyethylene glycol (MIRALAX) 17 g packet Take 17 g by mouth daily as needed for constipation, OTC      senna-docusate (SENOKOT-S/PERICOLACE) 8.6-50 MG tablet Take 1 tablet by mouth 2 times daily as needed for constipation, Disp-30 tablet, R-0, E-Prescribe              Condition on Discharge:  Discharge condition: Good   Discharge vitals: Blood pressure 132/43,  "pulse 71, temperature 98.4  F (36.9  C), temperature source Temporal, resp. rate 16, height 1.778 m (5' 10\"), weight 78.9 kg (174 lb), SpO2 96 %.   Code status on discharge: Full Code     History of Illness:  See detailed admission note for full details.    Significant Physical Exam Findings:  Constitutional: Awake, alert, cooperative, no apparent distress   Respiratory: Clear to auscultation bilaterally, no crackles or wheezing   Cardiovascular: Regular rate and rhythm, normal S1 and S2, and no murmur noted   Abdomen: Normal bowel sounds, soft, non-distended, non-tender   Skin: No rashes, no cyanosis, dry to touch   Neuro: Alert and oriented x3, no weakness, numbness, memory loss   Extremities: Left leg No edema, normal range of motion, Rt leg, post op wrapped, normal capillary refills.   Other(s):HEENT Pink, un icteric.         All other systems: Negative        Procedures other than Imaging:  Right Tib/Fib ORIF     Imaging:  Results for orders placed or performed during the hospital encounter of 03/28/21   XR Tibia & Fibula Right 2 Views    Narrative    TIBIA AND FIBULA RIGHT TWO VIEWS    3/28/2021 12:32 PM     HISTORY:  Tibia/fibula fracture, mid shin from playing soccer.      Impression    IMPRESSION:  1. Comminuted fracture involving the mid shaft of the tibia with 1 cm  of displacement and moderate apex medial and mild apex anterior  angulation.  2. Nondisplaced oblique fracture in the proximal shaft of the tibia.  3. Comminuted fracture at the junction of the proximal and middle  thirds of the shaft of the fibula with 29 mm of displacement and mild  apex medial and anterior angulation. There is also up to 1 cm of  bayonetting.    DADA MIX MD   Ankle XR, G/E 3 views, right    Narrative    ANKLE RIGHT THREE OR MORE VIEWS    3/28/2021 12:33 PM     HISTORY:  Soccer injury, rule out fracture      Impression    IMPRESSION: There are known diaphyseal fractures in the tibia and  fibula. Ankle radiographs " show no other abnormalities.    DADA MIX MD   XR Surgery GABO L/T 5 Min Fluoro w Stills    Narrative    This exam was marked as non-reportable because it will not be read by a   radiologist or a [place] non-radiologist provider.                Consultations:  Consultation during this admission received from orthopedics.     Recent Lab Results:  Recent Labs   Lab 03/30/21  0614 03/29/21  0744   WBC  --  9.4   HGB 11.8* 15.0   HCT  --  45.6   MCV  --  96   PLT  --  215     Recent Labs   Lab 03/30/21  0613 03/29/21  0744   NA  --  138   POTASSIUM  --  4.6   CHLORIDE  --  106   CO2  --  26   ANIONGAP  --  6   * 92   BUN  --  22   CR  --  1.01   GFRESTIMATED  --  >90   GFRESTBLACK  --  >90   VARINDER  --  8.3*          Pending Results:    Unresulted Labs Ordered in the Past 30 Days of this Admission     No orders found from 2/26/2021 to 3/29/2021.              Reason for your hospital stay    Right tibia fracture with IM nail     Follow-up and recommended labs and tests     Follow up with Dr. Luis Lynn , at (location with clinic name or city) [facility], [place] or [place], within 14 days  to evaluate after surgery. No follow up labs or test are needed prior to visit. At this visit x-rays will be taken, sutures/staples removed, and questions to be answered.  Bring any needed forms with to appointment.  Call for appointment: [phone]  After hours: [phone]  Fax: [phone] or [phone]     Wound care and dressings    Instructions to care for your wound at home: as directed, ice to area for comfort, reinforce dressing as needed and elevate right LE as much as possible for first two weeks.     Activity    Your activity upon discharge: activity as tolerated, ambulate in house and toe touch WB on the right LE     Rolling Knee Walker DME    DME Documentation: Describe the reason for need to support medical necessity: Impaired gait due to Foot/Ankle surgery. Anticipated length of  need: 3 months     Walker DME    DME Documentation: Describe the reason for need to support medical necessity: Impaired gait due to Foot/Ankle surgery. Anticipated length of need: 3 months     Miscellaneous DME Order    DME Documentation:   Describe the reason for need to support medical necessity: R tib/fib fx     I, the undersigned, certify that the above prescribed supplies are medically necessary for this patient and is both reasonable and necessary in reference to accepted standards of medical and necessary in reference to accepted standards of medical practice in the treatment of this patient's condition and is not prescribed as a convenience.     Diet    Follow this diet upon discharge: Regular adult diet       Hospital Course:  Tristen Hernandez is a 30 year old male presented following a trauma to his right leg while  Playing soccer and found to have Tib/ Fib fracture and admitted to the hospital. He underwent ORIF on 03/29 and remained stable post procedure and discharged with a follow up plan with surgery.   I discussed with patient the plan of care, all her questions and concerns addressed.       Total time spent in face to face contact with the patient and coordinating discharge was: <30 Minutes.

## 2021-03-30 NOTE — PLAN OF CARE
VSS, denies pain overnight. RLE cast c/d/I. Toes with good CMS, slightly swollen. Denies numbness.   Regular diet, up SBA- TTWB RLE.

## 2021-03-30 NOTE — PLAN OF CARE
Pt discharge to home. Discharge instructions reviewed with pt with  via ipad. Pt understood all instructions, meds, f/U appts needed. 5 meds filled at [facility] and given to pt. All belongings taken with pt.     Pt needs po abx. PA will send to [facility] pharmacy. Pt will need to  later. Updated pt via ipad .

## 2021-03-30 NOTE — PLAN OF CARE
Physical Therapy Discharge Summary    Reason for therapy discharge:    Discharged to home.    Progress towards therapy goal(s). See goals on Care Plan in [facility] electronic health record for goal details.  Goals partially met.  Barriers to achieving goals:   discharge on same date as initial evaluation.  Pt close to meet gait goal on stairs, but able to demonstrate safely with SBA/CGA on 8 steps.  Therapy recommendation(s):    Continue home exercise program.

## 2021-03-30 NOTE — PROGRESS NOTES
03/30/21 0700   Quick Adds   Type of Visit Initial PT Evaluation       Present yes   Language [place]ish   Living Environment   People in home spouse   Current Living Arrangements house   Home Accessibility stairs to enter home   Number of Stairs, Main Entrance other (see comments)  (20)   Stair Railings, Main Entrance railing on left side (ascending)   Transportation Anticipated family or friend will provide   Living Environment Comments Pt lives with wife in a 2nd floor townhouse with 20 steps to enter with L rail.  No stairs once pt enters his townhouse.  Wife is home during the day.   Self-Care   Usual Activity Tolerance excellent   Current Activity Tolerance moderate   Equipment Currently Used at Home none   Activity/Exercise/Self-Care Comment Pt was I with all ADLs and IADLs.  Pt works in [employer], doing [employer].     Disability/Function   Hearing Difficulty or Deaf no   Wear Glasses or Blind no   Concentrating, Remembering or Making Decisions Difficulty no   Difficulty Eating/Swallowing no   Dressing/Bathing Difficulty no   Toileting issues no   Doing Errands Independently Difficulty (such as shopping) yes   General Information   Onset of Illness/Injury or Date of Surgery 03/28/21   Referring Physician Laura Ibarra PA-C   Pertinent History of Current Problem (include personal factors and/or comorbidities that impact the POC) Tristen Hernandez is a 30 year old male who presents with displaced right tib/fib fracture after playing soccer and kicking another player in the knee.  Pt underwent ORIF of R tib fx and closed reduction of R fibula fx.     Weight-Bearing Status - RLE toe touch weight-bearing   General Observations Pt lying in bed with R LE elevated.     Cognition   Follows Commands (Cognition) follows two-step commands   Pain Assessment   Patient Currently in Pain Yes, see Vital Sign flowsheet  (R LE 7-8/10)   Integumentary/Edema   Integumentary/Edema Comments R LE incision  with cast   Posture    Posture Not impaired   Range of Motion (ROM)   ROM Comment R ankle casted   Strength   Strength Comments Pt demonstrates antigravity movement of R hip and knee.  B UEs and L LE WNL   Bed Mobility   Bed Mobility supine-sit;sit-supine   Supine-Sit [place] (Bed Mobility) independent   Comment (Bed Mobility) Pt using B UEs to assist R LE in/OOB.   Transfers   Transfers sit-stand transfer   Sit-Stand Transfer   Sit-Stand [place] (Transfers) supervision;verbal cues   Assistive Device (Sit-Stand Transfers) crutches, axillary;walker, front-wheeled   Sit/Stand Transfer Comments Sit <> stand from bed and chair to FWW and crutches with VCs for safe hand placement and correct positioning with SBA   Gait/Stairs (Locomotion)   [place] Level (Gait) supervision;verbal cues   Assistive Device (Gait) crutches, axillary;walker, front-wheeled   Distance in Feet (Required for LE Total Joints) 120' x 2   Comment (Gait/Stairs) Pt amb 120' with FWW and SBA with R TTWB.     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, unable to amb longer community distances, increased falls risk   Clinical Presentation Stable/Uncomplicated   Clinical Presentation Rationale pt medfcally stable   Clinical Decision Making (Complexity) low complexity   Therapy Frequency (PT) One time eval and treatment only   Predicted Duration of Therapy Intervention (days/wks) One day   Planned Therapy Interventions (PT) gait training;home exercise  program;patient/family education;stair training;strengthening;transfer training   Anticipated Equipment Needs at Discharge (PT) crutches, axillary   Risk & Benefits of therapy have been explained evaluation/treatment results reviewed;care plan/treatment goals reviewed;risks/benefits reviewed;current/potential barriers reviewed;participants voiced agreement with care plan;participants included;patient   PT Discharge Planning    PT Discharge Recommendation (DC Rec)   (per ortho MD)   PT Rationale for DC Rec Pt below baseline with mobility due to R ankle fx and TTWB status.  I anticipate that at time of discharge pt will be SBA with all gait using crutches on level surfaces and stairs.     PT Brief overview of current status  SBA with crutches   Total Evaluation Time   Total Evaluation Time (Minutes) 15

## 2021-03-30 NOTE — PROGRESS NOTES
Orthopedic Surgery  Tristen Hernandez  3/30/2021  Admit Date:  3/28/2021  POD 1 Day Post-Op  S/P Procedure(s):  1.  Open reduction, internal fixation, right proximal tibia fracture.  2.  Open reduction, internal fixation, right midshaft comminuted tibia fracture.  3.  Sharp excisional debridement of skin, subcutaneous tissue and curettage of bone, right open midshaft tibia fracture.  4.  Closed manipulation and nonoperative management of right midshaft fibula fracture.  5.  Application of a short leg splint.    Patient resting comfortably in bed.    Pain controlled.  Tolerating oral intake.    Denies nausea or vomiting  Denies chest pain or shortness of breath  No events overnight.     Alert and orient to person, place, and time. Language barrier but answers questions appropriately  Vital Sign Ranges  Temperature Temp  Av.5  F (36.9  C)  Min: 97.6  F (36.4  C)  Max: 99.1  F (37.3  C)   Blood pressure Systolic (24hrs), Av , Min:100 , Max:166        Diastolic (24hrs), Av, Min:44, Max:101      Pulse Pulse  Av.4  Min: 56  Max: 121   Respirations Resp  Av.1  Min: 12  Max: 28   Pulse oximetry SpO2  Av.2 %  Min: 90 %  Max: 100 %       Splint clean, dry and intact  Able to actively move toes  Sensation intact in toes  Brisk cap refill    Labs:  Recent Labs   Lab Test 21  0744   POTASSIUM 4.6     Recent Labs   Lab Test 21  0614 21  0744   HGB 11.8* 15.0     Recent Labs   Lab Test 21  0017   INR 1.05     Recent Labs   Lab Test 21  0744          A/P  1. Right tibial shaft fracture, open   Continue ASA for DVT prophylaxis.     Mobilize with PT/OT    TTWB right LE.   Leave splint intact     Continue current pain regiment.   Ok to discharge after a full 24 hours of post op abx completed    2. Disposition   Anticipate d/c to home based on PT recs and completion of IV abx for open fracture.    Laura Ibarra PA-C

## 2022-10-06 NOTE — OP NOTE
Procedure Date: 03/29/2021      PREOPERATIVE DIAGNOSES:     1.  Right type 1 open comminuted midshaft tibia fracture.   2.  Right closed proximal tibia fracture.   3.  Right closed midshaft fibula fracture.      POSTOPERATIVE DIAGNOSES:     1.  Right type 1 open comminuted midshaft tibia fracture.   2.  Right closed proximal tibia fracture.   3.  Right closed midshaft fibula fracture.      PROCEDURES PERFORMED:   1.  Open reduction, internal fixation, right proximal tibia fracture.   2.  Open reduction, internal fixation, right midshaft comminuted tibia fracture.   3.  Sharp excisional debridement of skin, subcutaneous tissue and curettage of bone, right open midshaft tibia fracture.   4.  Closed manipulation and nonoperative management of right midshaft fibula fracture.   5.  Application of a short leg splint.      SURGEON:  Luis Lynn MD      ASSISTANT:  ANIRUDH Sellers OPA, whose assistance was critical for positioning the patient, retraction during debridement, exposure, fracture reduction, placement of implants, closure and splint application.      ANESTHESIA:  General.      ESTIMATED BLOOD LOSS:  Less than 100 mL      FINDINGS:  Type 1 open midshaft tibia fracture.  Proximal tibia fracture.      IMPLANTS USED:  Synthes 345 x 10 mm tibial nail with 2 proximal interlocks and 2 distal interlocks.      INDICATIONS:  This is a 30-year-old male who yesterday was playing soccer and have the above-named injury.  He was treated in the emergency room with Ancef and splinted.  Discussed the nature of his injury and have recommended surgery.  The risks of his injury and surgery discussed included but not limited to bleeding, infection, damage to surrounding neurovascular structures, malunion, increased nonunion increased due to the segmental nature of this proximal and midshaft tibia fracture, blood clots go to the heart, lung or brain, anesthetic complications, even death.  This was done with an .   He understands and wishes to proceed.  Consent signed.      DESCRIPTION OF PROCEDURE:  The patient identified in preop holding area per hospital policy, correct operative site marked, to the OR, to the OR table.  General anesthesia induced.  Splint removed.  Abrasion with 5 mm opening type 1 open fracture over the tibia.  Chlorhexidine prescrub, ChloraPrep, draped.  A timeout was performed.  All in the room agreed.  I exsanguinated the leg for the debridements and initial exposure 250 mmHg for 20 minutes.  Medial to the tibial spine, made a 3-4 cm incision with a sharp scalpel excisional debridement at the fracture site of skin and subcutaneous tissue with curettage of bone ends and spike that had protruded through the skin with copious irrigation, 3 liters of normal saline and cysto tubing.      Used a pointed bone reduction clamp x 2 to perform the open reduction of the right midshaft tibia fracture.        Under fluoroscopy, we used a large pointed spinning bone reduction clamp to reduce and hold the proximal tibia fracture.        With the leg in a semi-extended position, performed a lateral approach, dissecting through skin, subcutaneous tissue and performed extra-articular approach to the starting point for the tibial nail.  Placed a starting pin under fluoroscopic guidance.  I then used the opening reamer while protecting the femoral condyle.  Passed a guide kristan across both the proximal tibia and tibial shaft fracture centering distally, confirming on AP and lateral views.  Measured for a 345 mm nail.  Reamed up sequentially from 8.5 to 11.5 mm with good chatter.  Placed a 345 x 10 mm Synthes nail, confirming position proximally and distally.  Proximal interlocks were placed, one a dynamic hole from medial to lateral and then one of the more proximal oblique holes.  We turned our attention distally, obtained perfect circles, small skin incision spreading down to the bone.  Placed 2 medial to lateral locking  screws with good purchase.  Final position showed near anatomic reduction of both the proximal and midshaft tibia fractures.  With this manipulation and closed reduction, there was appropriate alignment of the fibula.  There was a symmetric ankle mortise that was stable to external rotation stress.  The implants were in a safe position.  Copiously irrigated all wounds.  Closed the arthrotomy with 0 Vicryl, 2-0 Vicryl, staples.  The fracture site was closed with 2-0 Vicryl and 3-0 nylon.  The rest of the incisions were closed with 2-0 Vicryl and staples.  Sterile dressings applied as well as a splint, well-padded with the ankle in neutral position.      Awakened, transferred stable to PACU.      POSTOPERATIVE PLAN:   1.  Toe touch weightbearing x 6 weeks, right lower extremity.   2.  Elevate.   3.  Knee range of motion as tolerated.   4.  PT, OT.   5.  Deep venous thrombosis prophylaxis, 325 mg enteric-coated ASA x 6 weeks.   6.  Ancef x 24 hours.   7.  Limit narcotics.   8.  Follow up in Dr. Larsen's clinic at 2 weeks and 6 weeks with x-rays.         JEFF LARSEN MD             D: 2021   T: 2021   MT: LIZ      Name:     ANKUR PERRY   MRN:      [id number]        Account:        [id number]   :      1990           Procedure Date: 2021      Document: [id number]       Drysol Pregnancy And Lactation Text: This medication is considered safe during pregnancy and breast feeding.

## (undated) DEVICE — TUBING IRRIG CYSTO/BLADDER SET 81" LF 2C4040

## (undated) DEVICE — SYR 30ML LL W/O NDL 302832

## (undated) DEVICE — Device

## (undated) DEVICE — DRAPE C-ARMOR 5 SIDED 5523

## (undated) DEVICE — CAST PADDING 6" STERILE CS9046

## (undated) DEVICE — LINEN DRAPE 54X72" 5467

## (undated) DEVICE — NDL 18GA 1.5" 305196

## (undated) DEVICE — CAST PADDING 4" UNSTERILE 9044

## (undated) DEVICE — STPL SKIN 35W 6.9MM  PXW35

## (undated) DEVICE — GLOVE PROTEXIS MICRO 8.0  2D73PM80

## (undated) DEVICE — CAST PADDING 4" STERILE 9044S

## (undated) DEVICE — ROD SYN REAMER BALL TIP 2.5X950MM  351.706S

## (undated) DEVICE — LINEN FULL SHEET 5511

## (undated) DEVICE — SOL NACL 0.9% IRRIG 3000ML BAG 07972-08

## (undated) DEVICE — PREP CHLORAPREP 26ML TINTED HI-LITE ORANGE 930815

## (undated) DEVICE — DRSG XEROFORM 5X9" 8884431605

## (undated) DEVICE — SU VICRYL 2-0 CT-1 27" UND J259H

## (undated) DEVICE — LINEN HALF SHEET 5512

## (undated) DEVICE — DRAPE X-RAY TUBE 00-901169-01-OEC

## (undated) DEVICE — DRSG ABDOMINAL 07 1/2X8" 7197D

## (undated) DEVICE — DRSG GAUZE 4X4" TRAY

## (undated) DEVICE — DRILL BIT QUICK COUPLING 3 FLUTE 4.2MMX330/100MM CALIBRATE

## (undated) DEVICE — TOURNIQUET SGL  BLADDER 30"X4" BLUE 5921030135

## (undated) DEVICE — GLOVE PROTEXIS BLUE W/NEU-THERA 8.0  2D73EB80

## (undated) DEVICE — SPONGE LAP 18X18" X8435

## (undated) DEVICE — MANIFOLD NEPTUNE 4 PORT 700-20

## (undated) DEVICE — GLOVE PROTEXIS POWDER FREE 8.0 ORTHOPEDIC 2D73ET80

## (undated) DEVICE — DRILL BIT QUICK COUPLING 3 FLUTE 4.2MMX145MM NDL  POINT

## (undated) DEVICE — BNDG ELASTIC 6" DBL LENGTH UNSTERILE 6611-16

## (undated) DEVICE — ESU GROUND PAD ADULT W/CORD E7507

## (undated) DEVICE — PACK LOWER EXTREMITY RIDGES

## (undated) DEVICE — BAG CLEAR TRASH 1.3M 39X33" P4040C

## (undated) DEVICE — SOLUTION WOUND CLEANSING 3/4OZ 10% PVP EA-L3001

## (undated) DEVICE — CAST PADDING 6" UNSTERILE 9046

## (undated) DEVICE — SU VICRYL 0 CT-1 36" J346H

## (undated) DEVICE — CAST PLASTER SPLINT XTRA FAST 5X30" 7392

## (undated) RX ORDER — HYDROMORPHONE HYDROCHLORIDE 1 MG/ML
INJECTION, SOLUTION INTRAMUSCULAR; INTRAVENOUS; SUBCUTANEOUS
Status: DISPENSED
Start: 2021-03-29

## (undated) RX ORDER — CEFAZOLIN SODIUM 2 G/100ML
INJECTION, SOLUTION INTRAVENOUS
Status: DISPENSED
Start: 2021-03-29

## (undated) RX ORDER — LIDOCAINE HYDROCHLORIDE 10 MG/ML
INJECTION, SOLUTION EPIDURAL; INFILTRATION; INTRACAUDAL; PERINEURAL
Status: DISPENSED
Start: 2021-03-29

## (undated) RX ORDER — GLYCOPYRROLATE 0.2 MG/ML
INJECTION INTRAMUSCULAR; INTRAVENOUS
Status: DISPENSED
Start: 2021-03-29

## (undated) RX ORDER — PROPOFOL 10 MG/ML
INJECTION, EMULSION INTRAVENOUS
Status: DISPENSED
Start: 2021-03-29

## (undated) RX ORDER — FENTANYL CITRATE 50 UG/ML
INJECTION, SOLUTION INTRAMUSCULAR; INTRAVENOUS
Status: DISPENSED
Start: 2021-03-29

## (undated) RX ORDER — BUPIVACAINE HYDROCHLORIDE AND EPINEPHRINE 5; 5 MG/ML; UG/ML
INJECTION, SOLUTION EPIDURAL; INTRACAUDAL; PERINEURAL
Status: DISPENSED
Start: 2021-03-29

## (undated) RX ORDER — ONDANSETRON 2 MG/ML
INJECTION INTRAMUSCULAR; INTRAVENOUS
Status: DISPENSED
Start: 2021-03-29

## (undated) RX ORDER — DEXAMETHASONE SODIUM PHOSPHATE 4 MG/ML
INJECTION, SOLUTION INTRA-ARTICULAR; INTRALESIONAL; INTRAMUSCULAR; INTRAVENOUS; SOFT TISSUE
Status: DISPENSED
Start: 2021-03-29